# Patient Record
Sex: FEMALE
[De-identification: names, ages, dates, MRNs, and addresses within clinical notes are randomized per-mention and may not be internally consistent; named-entity substitution may affect disease eponyms.]

---

## 2020-05-18 ENCOUNTER — TELEPHONE (OUTPATIENT)
Dept: SCHEDULING | Age: 25
End: 2020-05-18

## 2020-12-22 ENCOUNTER — OFFICE VISIT (OUTPATIENT)
Dept: OBGYN CLINIC | Facility: CLINIC | Age: 25
End: 2020-12-22
Payer: COMMERCIAL

## 2020-12-22 ENCOUNTER — TELEPHONE (OUTPATIENT)
Dept: OBGYN CLINIC | Facility: CLINIC | Age: 25
End: 2020-12-22

## 2020-12-22 ENCOUNTER — LAB ENCOUNTER (OUTPATIENT)
Dept: LAB | Facility: HOSPITAL | Age: 25
End: 2020-12-22
Attending: OBSTETRICS & GYNECOLOGY

## 2020-12-22 VITALS
WEIGHT: 133 LBS | BODY MASS INDEX: 22.71 KG/M2 | SYSTOLIC BLOOD PRESSURE: 110 MMHG | DIASTOLIC BLOOD PRESSURE: 60 MMHG | HEIGHT: 64 IN

## 2020-12-22 DIAGNOSIS — N92.6 IRREGULAR UTERINE BLEEDING: ICD-10-CM

## 2020-12-22 DIAGNOSIS — N93.9 VAGINAL BLEEDING: ICD-10-CM

## 2020-12-22 DIAGNOSIS — N93.9 VAGINAL BLEEDING: Primary | ICD-10-CM

## 2020-12-22 DIAGNOSIS — Z30.09 ENCOUNTER FOR COUNSELING REGARDING CONTRACEPTION: ICD-10-CM

## 2020-12-22 PROCEDURE — 99204 OFFICE O/P NEW MOD 45 MIN: CPT | Performed by: OBSTETRICS & GYNECOLOGY

## 2020-12-22 PROCEDURE — 3078F DIAST BP <80 MM HG: CPT | Performed by: OBSTETRICS & GYNECOLOGY

## 2020-12-22 PROCEDURE — 3074F SYST BP LT 130 MM HG: CPT | Performed by: OBSTETRICS & GYNECOLOGY

## 2020-12-22 PROCEDURE — 81025 URINE PREGNANCY TEST: CPT | Performed by: OBSTETRICS & GYNECOLOGY

## 2020-12-22 PROCEDURE — 36415 COLL VENOUS BLD VENIPUNCTURE: CPT

## 2020-12-22 PROCEDURE — 84702 CHORIONIC GONADOTROPIN TEST: CPT

## 2020-12-22 PROCEDURE — 3008F BODY MASS INDEX DOCD: CPT | Performed by: OBSTETRICS & GYNECOLOGY

## 2020-12-22 NOTE — PROGRESS NOTES
HPI:   Ebony Holguin is a 31year old female who presents for a hx of irregular vaginal bleeding starting approx mid nov 2020. Pt stated she normally uses condoms for contraception but did not use it once in mid November as well.  Pt counseled extensively Location: Right arm, Patient Position: Sitting, Cuff Size: adult)   Ht 5' 4\" (1.626 m)   Wt 133 lb (60.3 kg)   LMP 11/23/2020 (Approximate)   Breastfeeding No   BMI 22.83 kg/m²   Body mass index is 22.83 kg/m².    GENERAL: well developed, well nourished,in

## 2020-12-26 ENCOUNTER — LAB ENCOUNTER (OUTPATIENT)
Dept: LAB | Facility: HOSPITAL | Age: 25
End: 2020-12-26
Attending: OBSTETRICS & GYNECOLOGY

## 2020-12-26 DIAGNOSIS — N93.9 VAGINAL BLEEDING: ICD-10-CM

## 2020-12-26 PROCEDURE — 84702 CHORIONIC GONADOTROPIN TEST: CPT

## 2020-12-26 PROCEDURE — 36415 COLL VENOUS BLD VENIPUNCTURE: CPT

## 2020-12-30 ENCOUNTER — TELEPHONE (OUTPATIENT)
Dept: OBGYN CLINIC | Facility: CLINIC | Age: 25
End: 2020-12-30

## 2020-12-30 NOTE — TELEPHONE ENCOUNTER
----- Message from Anushka Pradhan MD sent at 12/30/2020  1:22 PM CST -----  bhcg dropped from 34 to 17 , c/w nonviable early pregnancy vs early miscarriage/chemical pregnancy. Called pt and left message to call us back.

## 2021-11-11 ENCOUNTER — TELEPHONE (OUTPATIENT)
Dept: OTHER | Age: 26
End: 2021-11-11

## 2021-11-11 ENCOUNTER — OFFICE VISIT (OUTPATIENT)
Dept: FAMILY MEDICINE CLINIC | Facility: CLINIC | Age: 26
End: 2021-11-11
Payer: COMMERCIAL

## 2021-11-11 VITALS
WEIGHT: 140 LBS | BODY MASS INDEX: 23.9 KG/M2 | HEIGHT: 64 IN | DIASTOLIC BLOOD PRESSURE: 79 MMHG | HEART RATE: 77 BPM | SYSTOLIC BLOOD PRESSURE: 127 MMHG

## 2021-11-11 DIAGNOSIS — N89.8 VAGINAL DISCHARGE: Primary | ICD-10-CM

## 2021-11-11 DIAGNOSIS — M79.605 LEFT LEG PAIN: ICD-10-CM

## 2021-11-11 DIAGNOSIS — L02.214 CUTANEOUS ABSCESS OF GROIN: ICD-10-CM

## 2021-11-11 PROCEDURE — 3008F BODY MASS INDEX DOCD: CPT | Performed by: PHYSICIAN ASSISTANT

## 2021-11-11 PROCEDURE — 3074F SYST BP LT 130 MM HG: CPT | Performed by: PHYSICIAN ASSISTANT

## 2021-11-11 PROCEDURE — 3078F DIAST BP <80 MM HG: CPT | Performed by: PHYSICIAN ASSISTANT

## 2021-11-11 PROCEDURE — 99203 OFFICE O/P NEW LOW 30 MIN: CPT | Performed by: PHYSICIAN ASSISTANT

## 2021-11-11 RX ORDER — SULFAMETHOXAZOLE AND TRIMETHOPRIM 800; 160 MG/1; MG/1
1 TABLET ORAL 2 TIMES DAILY
Qty: 14 TABLET | Refills: 0 | Status: SHIPPED | OUTPATIENT
Start: 2021-11-11 | End: 2021-11-18

## 2021-11-11 NOTE — PROGRESS NOTES
HPI:     HPI   31year-old female is here in the office complains of intermittent leg pain for the past week. No pain today. No numbness and tingling sensation. No leg swollen and no redness.   Patient also complains of vaginal discharge and odor for 3 days Concerns:        Not on file    Social History Narrative      Not on file    Social Determinants of Health  Financial Resource Strain: Not on file  Food Insecurity: Not on file  Transportation Needs: Not on file  Physical Activity: Not on file  Stress: Not eye: No discharge. Left eye: No discharge. Conjunctiva/sclera: Conjunctivae normal.   Cardiovascular:      Rate and Rhythm: Normal rate and regular rhythm. Heart sounds: Normal heart sounds, S1 normal and S2 normal. No murmur heard.

## 2021-11-11 NOTE — TELEPHONE ENCOUNTER
Reason for Call/Symptoms: pt states she is having pain in upper left leg  Onset: symptoms started a week ago  Courtesy Assessment: pt states pain is in the back of her leg and close to the groin area. Pain is an achy pain, today pain worsened.  Rates pain 7

## 2021-11-12 ENCOUNTER — TELEPHONE (OUTPATIENT)
Dept: FAMILY MEDICINE CLINIC | Facility: CLINIC | Age: 26
End: 2021-11-12

## 2021-11-12 NOTE — TELEPHONE ENCOUNTER
The patient called back to confirm. She will continue to take the Bactrim and vaginal cream.  She has already started the Bactrim.     Discussed with Chaitanya who stated orders were from Mellisa Mehta [place]

## 2021-11-12 NOTE — TELEPHONE ENCOUNTER
----- Message from Parul Tellez PA-C sent at 11/12/2021 10:30 AM CST -----  Discontinue Flagyl. Start Clindamycin vaginal cream. Rx sent to pharmacy.

## 2022-01-12 ENCOUNTER — OFFICE VISIT (OUTPATIENT)
Dept: OBGYN CLINIC | Facility: CLINIC | Age: 27
End: 2022-01-12
Payer: COMMERCIAL

## 2022-01-12 VITALS — DIASTOLIC BLOOD PRESSURE: 62 MMHG | BODY MASS INDEX: 21 KG/M2 | WEIGHT: 124 LBS | SYSTOLIC BLOOD PRESSURE: 110 MMHG

## 2022-01-12 DIAGNOSIS — N92.6 MISSED MENSES: Primary | ICD-10-CM

## 2022-01-12 DIAGNOSIS — L73.9 FOLLICULITIS: ICD-10-CM

## 2022-01-12 DIAGNOSIS — N91.2 AMENORRHEA: ICD-10-CM

## 2022-01-12 LAB
CONTROL LINE PRESENT WITH A CLEAR BACKGROUND (YES/NO): YES YES/NO
KIT LOT #: NORMAL NUMERIC

## 2022-01-12 PROCEDURE — 3074F SYST BP LT 130 MM HG: CPT | Performed by: OBSTETRICS & GYNECOLOGY

## 2022-01-12 PROCEDURE — 99214 OFFICE O/P EST MOD 30 MIN: CPT | Performed by: OBSTETRICS & GYNECOLOGY

## 2022-01-12 PROCEDURE — 81025 URINE PREGNANCY TEST: CPT | Performed by: OBSTETRICS & GYNECOLOGY

## 2022-01-12 PROCEDURE — 3078F DIAST BP <80 MM HG: CPT | Performed by: OBSTETRICS & GYNECOLOGY

## 2022-01-12 RX ORDER — AMOXICILLIN 250 MG/1
250 CAPSULE ORAL 3 TIMES DAILY
Qty: 30 CAPSULE | Refills: 0 | Status: SHIPPED | OUTPATIENT
Start: 2022-01-12 | End: 2022-01-22

## 2022-01-12 NOTE — PROGRESS NOTES
@NAME is a 31year old female who presents for a    1) amenorrhea/missed menses/pcv:  Pt was counseled extensively on pregnancy care including diet in pregnancy/medications in pregnancy/weight gain in pregnancy/travel precautions in pregnancy/zika and covi hx of anemia  ENDOCRINE: denies thyroid history  ALL/ASTHMA: denies hx of allergy or asthma    EXAM:   /62   Wt 124 lb (56.2 kg)   LMP 11/23/2021   Breastfeeding No   BMI 21.28 kg/m²   Body mass index is 21.28 kg/m².    GENERAL: well developed, well n vaccination. All questions answered.

## 2022-01-19 ENCOUNTER — OFFICE VISIT (OUTPATIENT)
Dept: OBGYN CLINIC | Facility: CLINIC | Age: 27
End: 2022-01-19
Payer: COMMERCIAL

## 2022-01-19 VITALS
WEIGHT: 124 LBS | DIASTOLIC BLOOD PRESSURE: 74 MMHG | BODY MASS INDEX: 21.17 KG/M2 | SYSTOLIC BLOOD PRESSURE: 108 MMHG | HEIGHT: 64 IN

## 2022-01-19 DIAGNOSIS — N91.2 AMENORRHEA: Primary | ICD-10-CM

## 2022-01-19 DIAGNOSIS — N92.6 MISSED MENSES: ICD-10-CM

## 2022-01-19 PROCEDURE — 3078F DIAST BP <80 MM HG: CPT | Performed by: OBSTETRICS & GYNECOLOGY

## 2022-01-19 PROCEDURE — 99214 OFFICE O/P EST MOD 30 MIN: CPT | Performed by: OBSTETRICS & GYNECOLOGY

## 2022-01-19 PROCEDURE — 3008F BODY MASS INDEX DOCD: CPT | Performed by: OBSTETRICS & GYNECOLOGY

## 2022-01-19 PROCEDURE — 3074F SYST BP LT 130 MM HG: CPT | Performed by: OBSTETRICS & GYNECOLOGY

## 2022-01-19 NOTE — PROGRESS NOTES
@NAME is a 31year old female who presents for a    1) amenorrhea/missed menses/pcv:  Pt was counseled extensively on pregnancy care including diet in pregnancy/medications in pregnancy/weight gain in pregnancy/travel precautions in pregnancy/zika and covi is 21.28 kg/m².    GENERAL: well developed, well nourished,in no apparent distress  SKIN: no rashes,no suspicious lesions  HEENT: atraumatic, normocephalic  EYES:normal in appearance  NECK: supple,no adenopathy  CHEST: no chest tenderness  LUNGS: clear to a

## 2022-01-20 ENCOUNTER — TELEPHONE (OUTPATIENT)
Dept: OBGYN CLINIC | Facility: CLINIC | Age: 27
End: 2022-01-20

## 2022-01-20 NOTE — TELEPHONE ENCOUNTER
Patient name and  verified. Patient scheduled incorrectly for RN education visit today. Per ASJ note patient to have repeat ob ultrasound with him in 2 weeks and then to schedule RN education. appt cancelled.

## 2022-02-03 ENCOUNTER — TELEPHONE (OUTPATIENT)
Dept: OBGYN CLINIC | Facility: CLINIC | Age: 27
End: 2022-02-03

## 2022-02-03 NOTE — TELEPHONE ENCOUNTER
Pt had ingrown hair in vagina area. ASJ prescribed Amoxicillin.  Pt has taken all of RX and still in pain

## 2022-02-03 NOTE — TELEPHONE ENCOUNTER
Patient name and  verified. Patient previously given amox for folliculitis on 9/10/7652. States she is still in pain and still has lump to L groin area. Per patient believes lump is internal and now has redness. Has tried warm compresses without relief. Appt made for tomorrow with [facility]. Patient aware of time and location.

## 2022-02-04 ENCOUNTER — OFFICE VISIT (OUTPATIENT)
Dept: OBGYN CLINIC | Facility: CLINIC | Age: 27
End: 2022-02-04
Payer: COMMERCIAL

## 2022-02-04 VITALS — DIASTOLIC BLOOD PRESSURE: 62 MMHG | SYSTOLIC BLOOD PRESSURE: 110 MMHG | BODY MASS INDEX: 24 KG/M2 | WEIGHT: 137 LBS

## 2022-02-04 DIAGNOSIS — R19.09 LEFT GROIN MASS: Primary | ICD-10-CM

## 2022-02-04 PROCEDURE — 3074F SYST BP LT 130 MM HG: CPT | Performed by: NURSE PRACTITIONER

## 2022-02-04 PROCEDURE — 99213 OFFICE O/P EST LOW 20 MIN: CPT | Performed by: NURSE PRACTITIONER

## 2022-02-04 PROCEDURE — 3078F DIAST BP <80 MM HG: CPT | Performed by: NURSE PRACTITIONER

## 2022-02-07 ENCOUNTER — OFFICE VISIT (OUTPATIENT)
Dept: SURGERY | Facility: CLINIC | Age: 27
End: 2022-02-07
Payer: COMMERCIAL

## 2022-02-07 VITALS — HEIGHT: 64 IN | WEIGHT: 137 LBS | BODY MASS INDEX: 23.39 KG/M2

## 2022-02-07 DIAGNOSIS — R19.09 LEFT GROIN MASS: Primary | ICD-10-CM

## 2022-02-07 PROCEDURE — 99243 OFF/OP CNSLTJ NEW/EST LOW 30: CPT | Performed by: SURGERY

## 2022-02-07 PROCEDURE — 3008F BODY MASS INDEX DOCD: CPT | Performed by: SURGERY

## 2022-02-09 ENCOUNTER — OFFICE VISIT (OUTPATIENT)
Dept: OBGYN CLINIC | Facility: CLINIC | Age: 27
End: 2022-02-09
Payer: COMMERCIAL

## 2022-02-09 VITALS — SYSTOLIC BLOOD PRESSURE: 110 MMHG | DIASTOLIC BLOOD PRESSURE: 70 MMHG

## 2022-02-09 DIAGNOSIS — N92.6 MISSED MENSES: Primary | ICD-10-CM

## 2022-02-09 PROCEDURE — 3074F SYST BP LT 130 MM HG: CPT | Performed by: OBSTETRICS & GYNECOLOGY

## 2022-02-09 PROCEDURE — 99214 OFFICE O/P EST MOD 30 MIN: CPT | Performed by: OBSTETRICS & GYNECOLOGY

## 2022-02-09 PROCEDURE — 3078F DIAST BP <80 MM HG: CPT | Performed by: OBSTETRICS & GYNECOLOGY

## 2022-02-09 NOTE — PROGRESS NOTES
@NAME is a 31year old female who presents for a    1) amenorrhea/missed menses/pcv:  Pt was counseled extensively on pregnancy care including diet in pregnancy/medications in pregnancy/weight gain in pregnancy/travel precautions in pregnancy/zika and covid 19 precautions in pregnancy/ genetic testing in pregnancy/ dietary restrictions in pregnancy/among others and the pt voiced her understanding and all questions answered. Positive ucg noted today, pt counseled. Wt Readings from Last 6 Encounters:  02/07/22 : 137 lb (62.1 kg)  02/04/22 : 137 lb (62.1 kg)  01/19/22 : 124 lb (56.2 kg)  01/12/22 : 124 lb (56.2 kg)  11/11/21 : 140 lb (63.5 kg)  12/22/20 : 133 lb (60.3 kg)      There is no height or weight on file to calculate BMI. No results found for: CHOLEST, HDL, LDL, TRIGLY, AST, ALT     No current outpatient medications on file. No past medical history on file. No past surgical history on file. No family history on file. Social History:   Social History    Tobacco Use      Smoking status: Never Smoker      Smokeless tobacco: Never Used    Vaping Use      Vaping Use: Never used    Alcohol use: Yes    Drug use: Never           REVIEW OF SYSTEMS:   GENERAL: feels well otherwise  SKIN: denies any unusual skin lesions  EYES:denies blurred vision or double vision  HEENT: denies nasal congestion, sinus pain or ST  LUNGS: denies shortness of breath with exertion  CARDIOVASCULAR: denies chest pain on exertion  GI: denies abdominal pain,denies heartburn  : denies dysuria, vaginal discharge or itching,periods regular   MUSCULOSKELETAL: denies back pain  NEURO: denies headaches  PSYCHE: denies depression or anxiety  HEMATOLOGIC: denies hx of anemia  ENDOCRINE: denies thyroid history  ALL/ASTHMA: denies hx of allergy or asthma    EXAM:   /70   LMP 11/23/2021   There is no height or weight on file to calculate BMI.    GENERAL: well developed, well nourished,in no apparent distress  SKIN: no rashes,no suspicious lesions  HEENT: atraumatic, normocephalic  EYES:normal in appearance  NECK: supple,no adenopathy  CHEST: no chest tenderness  LUNGS: clear to auscultation  CARDIO: RRR without murmur  GI: good BS's,no masses, HSM or tenderness  Transvaginal ultrasound:  [facility],  crl c/w 8 1/7 weeks and [facility] 9/20/22. Small 0.92 cm subchor hemorrhage noted. Pt stated had scant spotting once before, none currently. Abo rh ordered. Normal adnexae, normal cx , normal uterus. fhts 150. MUSCULOSKELETAL: back is not tender,FROM of the back  EXTREMITIES: no cyanosis, clubbing or edema  NEURO: Oriented times three      ASSESSMENT AND PLAN:   Pilo Jones is a 31year old female who presents for a     1) amenorrhea/missed menses/pcv:  Pt was counseled extensively on pregnancy care including diet in pregnancy/medications in pregnancy/weight gain in pregnancy/travel precautions in pregnancy/zika and covid 19 precautions in pregnancy/ genetic testing in pregnancy/ dietary restrictions in pregnancy/among others and the pt voiced her understanding and all questions answered. Positive ucg noted today, pt counseled. Transvaginal ultrasound:  [facility],  crl c/w 8 1/7 weeks and [facility] 9/20/22. Small 0.92 cm subchor hemorrhage noted. Pt stated had scant spotting once before, none currently. Abo rh ordered. Normal adnexae, normal cx , normal uterus. fhts 150. Epic chart reviewed. All questions answered.

## 2022-02-16 NOTE — H&P
Chief complaint: Patient presents with:  Mass: Referred by ROSENDO Thomson for painful L groin mass which has been present for about 2 months. HPI: Ash Pearce resents for consultation related to a left groin mass which has been present for about 2 months. Ash Pearce first noticed a bump on her left groin in November, treated with an antibiotic. The bump failed to resolve and Ash Pearce felt that the bump had gotten bigger. She returned to have the area re-assessed and was prescribed amoxicillin. The bump is tender to touch and causes irritation in certain clothes or doing activities. The patient states that it is approx the size of a nickel. She has been using hot compresses regularly without resolution. The area is not open, it does not drain fluid. She denies fever or chills. The tenderness does not radiate. 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 Past medical history: History reviewed. No pertinent past medical history. Past surgical history: History reviewed. No pertinent surgical history. Allergies: No Known Allergies    Medications:   No current outpatient medications on file. Social history:   Social History    Socioeconomic History      Marital status: Single    Tobacco Use      Smoking status: Never Smoker      Smokeless tobacco: Never Used    Vaping Use      Vaping Use: Never used    Substance and Sexual Activity      Alcohol use: Yes      Drug use: Never       Family history:  History reviewed. No pertinent family history.      Review of Systems:   GENERAL: feels generally well  SKIN: no ulcerated or worrisome skin lesions  EYES:denies blurred vision or double vision  HEENT: denies new nasal congestion, sinus pain or ST  LUNGS: denies shortness of breath with exertion  CARDIOVASCULAR: denies chest pain on exertion  GI: no hematemesis, no BRBPR, no worsening heartburn  : no dysuria, no blood in urine, no difficulty urinating  MUSCULOSKELETAL: no new musculoskeletal complaints  NEURO: no persistent, recurrent  headaches  PSYCHE:no depression or anxiety  HEMATOLOGIC: no hx of blood dyscrasia  ENDOCRINE: no new endocrine problems  ALL/ASTHMA: no new hx of severe allergy or asthma  BACK: normal, no spinal deformity, no CVA tenderness    Physical examination:  Constitutional: appears well hydrated alert and responsive no acute distress noted  Head/Face: normocephalic, atraumatic. Eyes: Anicteric, PERRL  Nose/Mouth/Throat: nose and throat are clear,  no discharge, mucous membranes are moist, no oral lesions are noted  Neck/Thyroid: neck is supple without adenopathy, no thyromegaly, no JVD, no carotid bruits. Respiratory: normal to inspection, lungs are clear to auscultation bilaterally, normal respiratory effort, no wheezing. Cardiovascular: regular rate. Abdomen: soft, non-tender, non-distended, no organomegaly noted, no masses, no hernias, no ascites. Groin: Mass appearing inflammatory, cystic on palpation. Slightly tender, not fluctuant. Extremities: no edema, cyanosis, or clubbing. No deformity. Musculoskeletal: normal appearance, no deformities, normal function. Back wnl, no CVA tenderness. Neurological: exam appropriate for age reflexes and motor skills appropriate for age      Assessment and plan:  Diagnoses and all orders for this visit:    Left groin mass       We will plan surgical excision. The risks and benefits of surgery of been discussed, the differential diagnosis has been discussed.   Thank you for the referral.    Chantale Adame MD  2/16/2022  2:43 PM

## 2022-02-21 ENCOUNTER — TELEPHONE (OUTPATIENT)
Dept: OBGYN CLINIC | Facility: CLINIC | Age: 27
End: 2022-02-21

## 2022-02-21 ENCOUNTER — LAB ENCOUNTER (OUTPATIENT)
Dept: LAB | Age: 27
End: 2022-02-21
Attending: OBSTETRICS & GYNECOLOGY
Payer: COMMERCIAL

## 2022-02-21 DIAGNOSIS — O20.0 THREATENED ABORTION, ANTEPARTUM: ICD-10-CM

## 2022-02-21 LAB
ANTIBODY SCREEN: NEGATIVE
B-HCG SERPL-ACNC: ABNORMAL MIU/ML

## 2022-02-21 PROCEDURE — 84702 CHORIONIC GONADOTROPIN TEST: CPT

## 2022-02-21 PROCEDURE — 86850 RBC ANTIBODY SCREEN: CPT

## 2022-02-21 PROCEDURE — 36415 COLL VENOUS BLD VENIPUNCTURE: CPT

## 2022-02-21 NOTE — TELEPHONE ENCOUNTER
Patient had a lot of bleeding that started Saturday night and continued a little into Sunday morning. It was more than spotting. Please advise.

## 2022-02-21 NOTE — TELEPHONE ENCOUNTER
Patient informed of MLM recommendations. Lab orders placed and encouraged patient to complete labs today and repeat quant on Wednesday. Reviewed subchorionic hemorrhage noted on ultrasound with patient. Bleeding precautions reviewed with patient. Advised patient pelvic rest and aware for severer bleeding or pain patient to go to ED. Offered to schedule patient with [address] patient declined and states she will monitor. Encouraged patient to contact office with any questions.

## 2022-02-21 NOTE — TELEPHONE ENCOUNTER
I recommend that patient go to Lab for serial Quant BHCG's, one today and one Wednesday. Patient also needs to do Blood Type and Antibody screen today. Review Miscarriage Precautions with patient. Call patient.

## 2022-02-21 NOTE — TELEPHONE ENCOUNTER
OB History     T0    L0    SAB0  IAB0  Ectopic0  Multiple0  Live Births0   9w6d by ultrasound      Patient name and  verified. Patient complaining of vaginal bleeding since Saturday. States its only upon wiping and denies pain. Recent intercourse was 3 days ago. Describes color of bleeding as pink. Believes she passed small clots on Saturday, denies passing clots now. Recent in office ultrasound with ASJ on 22 shows small subchorionic hemorrhage Providers in office booked and ASJ not in office today. Routing to on call provider.  Please advise

## 2022-02-23 ENCOUNTER — LAB ENCOUNTER (OUTPATIENT)
Dept: LAB | Age: 27
End: 2022-02-23
Attending: OBSTETRICS & GYNECOLOGY
Payer: COMMERCIAL

## 2022-02-23 DIAGNOSIS — O20.0 THREATENED ABORTION, ANTEPARTUM: ICD-10-CM

## 2022-02-23 DIAGNOSIS — N92.6 MISSED MENSES: ICD-10-CM

## 2022-02-23 LAB
B-HCG SERPL-ACNC: ABNORMAL MIU/ML
RH BLOOD TYPE: POSITIVE

## 2022-02-23 PROCEDURE — 86901 BLOOD TYPING SEROLOGIC RH(D): CPT

## 2022-02-23 PROCEDURE — 84702 CHORIONIC GONADOTROPIN TEST: CPT

## 2022-02-23 PROCEDURE — 86900 BLOOD TYPING SEROLOGIC ABO: CPT

## 2022-02-23 PROCEDURE — 36415 COLL VENOUS BLD VENIPUNCTURE: CPT

## 2022-02-24 ENCOUNTER — HOSPITAL ENCOUNTER (OUTPATIENT)
Dept: ULTRASOUND IMAGING | Facility: HOSPITAL | Age: 27
Discharge: HOME | End: 2022-02-24
Attending: OBSTETRICS & GYNECOLOGY
Payer: COMMERCIAL

## 2022-02-24 ENCOUNTER — TELEPHONE (OUTPATIENT)
Dept: OBGYN CLINIC | Facility: CLINIC | Age: 27
End: 2022-02-24

## 2022-02-24 DIAGNOSIS — O20.0 THREATENED ABORTION, ANTEPARTUM: ICD-10-CM

## 2022-02-24 PROCEDURE — 76801 OB US < 14 WKS SINGLE FETUS: CPT | Performed by: OBSTETRICS & GYNECOLOGY

## 2022-02-24 NOTE — TELEPHONE ENCOUNTER
Pt Name and  verified. Pt informed that STAT hold and call placed by Thomas Savage and she needs to go to [facility] today at 4pm. Pt informed to drink at least 32 oz of water prior to exam. Instructions on location given, main entrance and [facility]. Voiced understanding. Order placed for STAT hold and call and on call provider MLM notified.

## 2022-02-24 NOTE — TELEPHONE ENCOUNTER
Pt stated had intercourse last sat once, and pt was sent for serial bhcg. Discussed bhcg with pt, and I advised a stat hold and call ultrasound to reassess pregnancy. Spoke with Uriah Paige and this is being arranged now for today.

## 2022-02-24 NOTE — TELEPHONE ENCOUNTER
Pt Name and  verified. Pt informed that provider is in a surgery and has not had an opportunity to look at her results. Advised that once he is in office, we will have him view results so he we can inform. Voiced understanding.

## 2022-02-25 ENCOUNTER — TELEPHONE (OUTPATIENT)
Dept: OBGYN CLINIC | Facility: CLINIC | Age: 27
End: 2022-02-25

## 2022-02-25 NOTE — TELEPHONE ENCOUNTER
Pt was already appraised of ultrasound findings of missed ab yesterday by dr Kwaku Li per pt. Pt counseled on options of management including expectant management vs medical management with meds vs surgical management with suction d & c,   Risks/benefits/alternatives counseled, and pt wants expectant management for now, and will call me in 3 days with an update, pt is considering a d & c as well and will call me in 3 days about her decision. Pt aware to go to the ER with any signiifcant pain/bleeding/any other problem.

## 2022-02-26 ENCOUNTER — TELEPHONE (OUTPATIENT)
Dept: OBGYN CLINIC | Facility: CLINIC | Age: 27
End: 2022-02-26

## 2022-02-26 DIAGNOSIS — O02.1 MISSED ABORTION: Primary | ICD-10-CM

## 2022-02-26 NOTE — TELEPHONE ENCOUNTER
Please schedule the following surgery:    Procedure:suction dilation and curretage  Assist: (Y/N or none)none  Date: coordinate with pt  Dx:missed ab  Pre-op appt: (Y/N or n/a)no  Admission type: (IN/OUT/OBVS)  Department of discharge(SDS/Floor):  Expected length of stay:outpt  Procedure length time (please enter amount you are requesting):30 min  Recovery time (patients always ask):  Medical Clearance: (Y/N)no      ALL Medicaid/including BCBS community: Tubal/ Hyst form MUST be signed (30 days):     COVID19 Lab 5062 needs to be placed for all C-sections, IOL & Versions     Message to nurses:

## 2022-02-26 NOTE — TELEPHONE ENCOUNTER
Pt Name and  verified. Pt spoke with KOJO last night due to a confirmed miscarriage. Pt was advised to call if she wanted to have a D&C. Pt wants to know what is the soonest available and when this can be done. Pt has also missed work since she had a confirmed miscarriage. Needs a note for work as well as an estimated time frame of when she can return after having a D&C and does not feel comfortable going to work just yet.  pls advise

## 2022-02-26 NOTE — TELEPHONE ENCOUNTER
Patient is unsure of what type of appointment or procedure needs to be scheduled after her recent miscarriage. Please advise.

## 2022-02-28 ENCOUNTER — LAB ENCOUNTER (OUTPATIENT)
Dept: LAB | Facility: HOSPITAL | Age: 27
End: 2022-02-28
Attending: OBSTETRICS & GYNECOLOGY
Payer: COMMERCIAL

## 2022-02-28 DIAGNOSIS — Z01.818 PRE-OP TESTING: ICD-10-CM

## 2022-02-28 LAB — SARS-COV-2 RNA RESP QL NAA+PROBE: NOT DETECTED

## 2022-02-28 NOTE — TELEPHONE ENCOUNTER
Per Artem in [place], there is a 1030 available for tomorrow 3/1. Called and spoke to pt. Pt informed of surgery date and time. Minor case letter sent to pt's mychart.

## 2022-03-01 ENCOUNTER — HOSPITAL ENCOUNTER (OUTPATIENT)
Facility: HOSPITAL | Age: 27
Setting detail: HOSPITAL OUTPATIENT SURGERY
Discharge: HOME | End: 2022-03-01
Attending: OBSTETRICS & GYNECOLOGY | Admitting: OBSTETRICS & GYNECOLOGY
Payer: COMMERCIAL

## 2022-03-01 ENCOUNTER — ANESTHESIA EVENT (OUTPATIENT)
Dept: SURGERY | Facility: HOSPITAL | Age: 27
End: 2022-03-01
Payer: COMMERCIAL

## 2022-03-01 ENCOUNTER — TELEPHONE (OUTPATIENT)
Dept: OBGYN CLINIC | Facility: CLINIC | Age: 27
End: 2022-03-01

## 2022-03-01 ENCOUNTER — ANESTHESIA (OUTPATIENT)
Dept: SURGERY | Facility: HOSPITAL | Age: 27
End: 2022-03-01
Payer: COMMERCIAL

## 2022-03-01 VITALS
WEIGHT: 135.31 LBS | HEIGHT: 64 IN | SYSTOLIC BLOOD PRESSURE: 103 MMHG | TEMPERATURE: 97 F | RESPIRATION RATE: 16 BRPM | BODY MASS INDEX: 23.1 KG/M2 | DIASTOLIC BLOOD PRESSURE: 58 MMHG | HEART RATE: 53 BPM | OXYGEN SATURATION: 98 %

## 2022-03-01 DIAGNOSIS — Z01.818 PRE-OP TESTING: Primary | ICD-10-CM

## 2022-03-01 DIAGNOSIS — O02.1 MISSED ABORTION: ICD-10-CM

## 2022-03-01 PROCEDURE — 10D17ZZ EXTRACTION OF PRODUCTS OF CONCEPTION, RETAINED, VIA NATURAL OR ARTIFICIAL OPENING: ICD-10-PCS | Performed by: OBSTETRICS & GYNECOLOGY

## 2022-03-01 PROCEDURE — 59820 CARE OF MISCARRIAGE: CPT | Performed by: OBSTETRICS & GYNECOLOGY

## 2022-03-01 RX ORDER — HYDROCODONE BITARTRATE AND ACETAMINOPHEN 5; 325 MG/1; MG/1
2 TABLET ORAL AS NEEDED
Status: DISCONTINUED | OUTPATIENT
Start: 2022-03-01 | End: 2022-03-01

## 2022-03-01 RX ORDER — ONDANSETRON 2 MG/ML
4 INJECTION INTRAMUSCULAR; INTRAVENOUS ONCE AS NEEDED
Status: DISCONTINUED | OUTPATIENT
Start: 2022-03-01 | End: 2022-03-01

## 2022-03-01 RX ORDER — PROCHLORPERAZINE EDISYLATE 5 MG/ML
5 INJECTION INTRAMUSCULAR; INTRAVENOUS ONCE AS NEEDED
Status: DISCONTINUED | OUTPATIENT
Start: 2022-03-01 | End: 2022-03-01

## 2022-03-01 RX ORDER — NALOXONE HYDROCHLORIDE 0.4 MG/ML
80 INJECTION, SOLUTION INTRAMUSCULAR; INTRAVENOUS; SUBCUTANEOUS AS NEEDED
Status: DISCONTINUED | OUTPATIENT
Start: 2022-03-01 | End: 2022-03-01

## 2022-03-01 RX ORDER — HYDROMORPHONE HYDROCHLORIDE 1 MG/ML
0.6 INJECTION, SOLUTION INTRAMUSCULAR; INTRAVENOUS; SUBCUTANEOUS EVERY 5 MIN PRN
Status: DISCONTINUED | OUTPATIENT
Start: 2022-03-01 | End: 2022-03-01

## 2022-03-01 RX ORDER — HALOPERIDOL 5 MG/ML
0.25 INJECTION INTRAMUSCULAR ONCE AS NEEDED
Status: DISCONTINUED | OUTPATIENT
Start: 2022-03-01 | End: 2022-03-01

## 2022-03-01 RX ORDER — CEFAZOLIN SODIUM/WATER 2 G/20 ML
SYRINGE (ML) INTRAVENOUS AS NEEDED
Status: DISCONTINUED | OUTPATIENT
Start: 2022-03-01 | End: 2022-03-01 | Stop reason: SURG

## 2022-03-01 RX ORDER — HYDROMORPHONE HYDROCHLORIDE 1 MG/ML
0.4 INJECTION, SOLUTION INTRAMUSCULAR; INTRAVENOUS; SUBCUTANEOUS EVERY 5 MIN PRN
Status: DISCONTINUED | OUTPATIENT
Start: 2022-03-01 | End: 2022-03-01

## 2022-03-01 RX ORDER — ONDANSETRON 2 MG/ML
INJECTION INTRAMUSCULAR; INTRAVENOUS AS NEEDED
Status: DISCONTINUED | OUTPATIENT
Start: 2022-03-01 | End: 2022-03-01 | Stop reason: SURG

## 2022-03-01 RX ORDER — MIDAZOLAM HYDROCHLORIDE 1 MG/ML
INJECTION INTRAMUSCULAR; INTRAVENOUS AS NEEDED
Status: DISCONTINUED | OUTPATIENT
Start: 2022-03-01 | End: 2022-03-01 | Stop reason: SURG

## 2022-03-01 RX ORDER — SODIUM CHLORIDE, SODIUM LACTATE, POTASSIUM CHLORIDE, CALCIUM CHLORIDE 600; 310; 30; 20 MG/100ML; MG/100ML; MG/100ML; MG/100ML
INJECTION, SOLUTION INTRAVENOUS CONTINUOUS
Status: DISCONTINUED | OUTPATIENT
Start: 2022-03-01 | End: 2022-03-01

## 2022-03-01 RX ORDER — MORPHINE SULFATE 4 MG/ML
2 INJECTION, SOLUTION INTRAMUSCULAR; INTRAVENOUS EVERY 10 MIN PRN
Status: DISCONTINUED | OUTPATIENT
Start: 2022-03-01 | End: 2022-03-01

## 2022-03-01 RX ORDER — LIDOCAINE HYDROCHLORIDE 10 MG/ML
INJECTION, SOLUTION EPIDURAL; INFILTRATION; INTRACAUDAL; PERINEURAL AS NEEDED
Status: DISCONTINUED | OUTPATIENT
Start: 2022-03-01 | End: 2022-03-01 | Stop reason: SURG

## 2022-03-01 RX ORDER — MORPHINE SULFATE 10 MG/ML
6 INJECTION, SOLUTION INTRAMUSCULAR; INTRAVENOUS EVERY 10 MIN PRN
Status: DISCONTINUED | OUTPATIENT
Start: 2022-03-01 | End: 2022-03-01

## 2022-03-01 RX ORDER — DOXYCYCLINE HYCLATE 100 MG
100 TABLET ORAL 2 TIMES DAILY
Qty: 15 TABLET | Refills: 0 | Status: SHIPPED | OUTPATIENT
Start: 2022-03-01 | End: 2022-03-08

## 2022-03-01 RX ORDER — DEXAMETHASONE SODIUM PHOSPHATE 4 MG/ML
VIAL (ML) INJECTION AS NEEDED
Status: DISCONTINUED | OUTPATIENT
Start: 2022-03-01 | End: 2022-03-01 | Stop reason: SURG

## 2022-03-01 RX ORDER — MORPHINE SULFATE 4 MG/ML
4 INJECTION, SOLUTION INTRAMUSCULAR; INTRAVENOUS EVERY 10 MIN PRN
Status: DISCONTINUED | OUTPATIENT
Start: 2022-03-01 | End: 2022-03-01

## 2022-03-01 RX ORDER — HYDROCODONE BITARTRATE AND ACETAMINOPHEN 5; 325 MG/1; MG/1
1 TABLET ORAL AS NEEDED
Status: DISCONTINUED | OUTPATIENT
Start: 2022-03-01 | End: 2022-03-01

## 2022-03-01 RX ORDER — OXYTOCIN 10 [USP'U]/ML
INJECTION, SOLUTION INTRAMUSCULAR; INTRAVENOUS AS NEEDED
Status: DISCONTINUED | OUTPATIENT
Start: 2022-03-01 | End: 2022-03-01 | Stop reason: SURG

## 2022-03-01 RX ORDER — HYDROMORPHONE HYDROCHLORIDE 1 MG/ML
0.2 INJECTION, SOLUTION INTRAMUSCULAR; INTRAVENOUS; SUBCUTANEOUS EVERY 5 MIN PRN
Status: DISCONTINUED | OUTPATIENT
Start: 2022-03-01 | End: 2022-03-01

## 2022-03-01 RX ORDER — ACETAMINOPHEN 500 MG
1000 TABLET ORAL ONCE
Status: COMPLETED | OUTPATIENT
Start: 2022-03-01 | End: 2022-03-01

## 2022-03-01 RX ADMIN — ONDANSETRON 4 MG: 2 INJECTION INTRAMUSCULAR; INTRAVENOUS at 10:53:00

## 2022-03-01 RX ADMIN — MIDAZOLAM HYDROCHLORIDE 2 MG: 1 INJECTION INTRAMUSCULAR; INTRAVENOUS at 10:43:00

## 2022-03-01 RX ADMIN — CEFAZOLIN SODIUM/WATER 2 G: 2 G/20 ML SYRINGE (ML) INTRAVENOUS at 10:53:00

## 2022-03-01 RX ADMIN — OXYTOCIN 20 UNITS: 10 INJECTION, SOLUTION INTRAMUSCULAR; INTRAVENOUS at 11:00:00

## 2022-03-01 RX ADMIN — LIDOCAINE HYDROCHLORIDE 50 MG: 10 INJECTION, SOLUTION EPIDURAL; INFILTRATION; INTRACAUDAL; PERINEURAL at 10:49:00

## 2022-03-01 RX ADMIN — SODIUM CHLORIDE, SODIUM LACTATE, POTASSIUM CHLORIDE, CALCIUM CHLORIDE: 600; 310; 30; 20 INJECTION, SOLUTION INTRAVENOUS at 11:12:00

## 2022-03-01 RX ADMIN — DEXAMETHASONE SODIUM PHOSPHATE 4 MG: 4 MG/ML VIAL (ML) INJECTION at 10:53:00

## 2022-03-01 NOTE — BRIEF OP NOTE
Pre-Operative Diagnosis: Missed  [O02.1]     Post-Operative Diagnosis: Missed  [O02.1]      Procedure Performed:   SUCTION DILATION AND CURETTAGE    Surgeon(s) and Role:     * Tamiko Reddy MD - Primary    Assistant(s):      none  Surgical Findings: mod amount of poc evacuated and sent for chromosomes per pt request     Specimen: poc     Estimated Blood Loss: No data recorded    Dictation Number:  Pending. Allan Rogel MD  3/1/2022  11:17 AM

## 2022-03-01 NOTE — ANESTHESIA PROCEDURE NOTES
Airway  Date/Time: 3/1/2022 10:50 AM  Urgency: Elective      General Information and Staff    Patient location during procedure: OR  Anesthesiologist: Tsering Garces MD  Resident/CRNA: Amelia Araiza CRNA  Performed: CRNA     Indications and Patient Condition  Indications for airway management: anesthesia  Sedation level: deep  Preoxygenated: yes  Patient position: sniffing  Mask difficulty assessment: 1 - vent by mask    Final Airway Details  Final airway type: supraglottic airway      Successful airway: classic  Size 4      Number of attempts at approach: 1  Number of other approaches attempted: 0    Additional Comments  Atraumatic. Lips, tongue and all teeth in preop condition.

## 2022-03-02 NOTE — OPERATIVE REPORT
[facility]    PATIENT'S NAME: Angle Morales   ATTENDING PHYSICIAN: Allan Foster MD   OPERATING PHYSICIAN: Elidia Foster MD   PATIENT ACCOUNT#:   [de-identified]    LOCATION:  [place] [address]  MEDICAL RECORD #:   [id number]       YOB: 1995  ADMISSION DATE:       2022      OPERATION DATE:  2022    OPERATIVE REPORT    PREOPERATIVE DIAGNOSIS:  Missed . POSTOPERATIVE DIAGNOSIS:  Missed . PROCEDURE:  Suction dilation and curettage. ANESTHESIA:  General.    ESTIMATED BLOOD LOSS:  50 mL. COMPLICATIONS:  None. CONDITION:  The patient tolerated the procedure well was taken to [facility] in good condition. INDICATIONS:  The patient is a 29-year-old female noted to have missed AB/fetal demise on the initial ultrasound. She was also noted to have a dropping beta HCG. The patient was counseled on her options and after much thought, the patient requested a suction dilation and curettage with the risks, benefits, alternatives counseled including risk of bleeding, infection, damage to internal organs (including bowel, bladder, uterus, ovaries, tubes, cervix, vagina, ureters, blood vessels, among other organs), risk of anesthesia, risk of thromboembolic disease such as DVT, PE, MI, stroke, risk of hemorrhage and blood transfusion, risk of Asherman syndrome, among other risks. The patient voiced understanding of all the above, and her questions were answered. The patient is O positive. Therefore, RhoGAM was not indicated. FINDINGS:  Moderate amount of products of conception evacuated. OPERATIVE TECHNIQUE:  The patient was taken to the operating room and after induction of general anesthesia, the patient was prepped and draped in the usual sterile fashion. Two g of Ancef were infused preoperatively for infection prophylaxis. Exam under anesthesia was performed after which a weighted speculum was placed in the vagina.   A single-tooth tenaculum was placed on the anterior lip of the cervix. The patient voided prior to procedure. Therefore, catheterization was not needed of the bladder. The cervix was noted to be already dilated 1 cm. The #7 curved suction curette was advanced to the fundus, and a moderate amount of products of conception was evacuated. At this time, the curette was changed to a #8 curved suction curette in order to ensure that all products of conception were evacuated and no further products of conception were noted on use of the #8 curved suction curette curettage. Gentle curettage was then performed, and good uterine cry was noted in all quadrants. At the end the procedure, excellent hemostasis was noted. Single-tooth tenaculum was removed from the anterior lip of the cervix and excellent hemostasis was assured. All counts were correct. Good hemostasis noted at the end of the procedure. Pitocin 20 units in 1000 mL was started at the beginning of the procedure. The patient tolerated the procedure well and was taken to [facility] in good condition. Dictated By Olga Dent MD  d: 03/01/2022 19:47:04  t: 03/02/2022 00:09:09  Job [id number]  [facility]/

## 2022-03-04 ENCOUNTER — TELEPHONE (OUTPATIENT)
Dept: OBGYN CLINIC | Facility: CLINIC | Age: 27
End: 2022-03-04

## 2022-03-04 NOTE — TELEPHONE ENCOUNTER
pts forms received and logged for processing. Sent my chart message for HIPPA.  HIpAA signed in office invalid

## 2022-03-04 NOTE — TELEPHONE ENCOUNTER
Pt dropped off FMLA forms to be filled out. Pt sign HIPAA form but did not pay fee. Pt fill out would like forms to be faxed.

## 2022-03-08 ENCOUNTER — TELEPHONE (OUTPATIENT)
Dept: OBGYN UNIT | Facility: HOSPITAL | Age: 27
End: 2022-03-08

## 2022-03-11 NOTE — TELEPHONE ENCOUNTER
Pt Name and  verified. Pt provided with the number for the Forms dept. Advised to give them a call today or Monday again. Voiced understanding.

## 2022-03-14 NOTE — TELEPHONE ENCOUNTER
Dr. Kareem Patino,     Please sign off on form:  -Highlight the patient and hit \"Chart\" button. -In Chart Review, w/in the Encounter tab - click 1 time on the Telephone call encounter for 3/4/22 Scroll down the telephone encounter.  -Click \"scan on\" blue Hyperlink under \"Media\" heading for FMLA Dr. Kareem Patino 3/14/22 w/in the telephone enc.  -Click on Acknowledge button at the bottom right corner and left-click onto image, signature stamp appears and drag signature to Provider signature line. Stamp will turn blue. Close window.      Thank you,    Javy Gregory

## 2022-03-18 ENCOUNTER — TELEPHONE (OUTPATIENT)
Dept: OBGYN CLINIC | Facility: CLINIC | Age: 27
End: 2022-03-18

## 2022-04-01 ENCOUNTER — TELEPHONE (OUTPATIENT)
Dept: SURGERY | Facility: CLINIC | Age: 27
End: 2022-04-01

## 2022-04-06 ENCOUNTER — OFFICE VISIT (OUTPATIENT)
Dept: SURGERY | Facility: CLINIC | Age: 27
End: 2022-04-06
Payer: COMMERCIAL

## 2022-04-06 VITALS — BODY MASS INDEX: 23.05 KG/M2 | HEIGHT: 64 IN | WEIGHT: 135 LBS

## 2022-04-06 DIAGNOSIS — R19.09 LEFT GROIN MASS: Primary | ICD-10-CM

## 2022-04-06 PROCEDURE — 99213 OFFICE O/P EST LOW 20 MIN: CPT | Performed by: SURGERY

## 2022-04-06 PROCEDURE — 3008F BODY MASS INDEX DOCD: CPT | Performed by: SURGERY

## 2022-04-13 ENCOUNTER — OFFICE VISIT (OUTPATIENT)
Dept: SURGERY | Facility: CLINIC | Age: 27
End: 2022-04-13
Payer: COMMERCIAL

## 2022-04-13 VITALS — BODY MASS INDEX: 23.05 KG/M2 | HEIGHT: 64 IN | WEIGHT: 135 LBS

## 2022-04-13 DIAGNOSIS — R19.09 LEFT GROIN MASS: Primary | ICD-10-CM

## 2022-04-13 DIAGNOSIS — N75.1 ABSCESS OF LEFT BARTHOLIN'S GLAND: ICD-10-CM

## 2022-04-13 DIAGNOSIS — L73.2 HIDRADENITIS SUPPURATIVA: ICD-10-CM

## 2022-04-13 PROCEDURE — 88305 TISSUE EXAM BY PATHOLOGIST: CPT | Performed by: SURGERY

## 2022-04-13 RX ORDER — AMOXICILLIN AND CLAVULANATE POTASSIUM 875; 125 MG/1; MG/1
1 TABLET, FILM COATED ORAL 2 TIMES DAILY
Qty: 14 TABLET | Refills: 1 | Status: SHIPPED | OUTPATIENT
Start: 2022-04-13

## 2022-04-13 NOTE — PROCEDURES
Procedure Note  The risks, benefits, alternatives and expected recovery were explained. The pt expressed understanding and wished to proceed with the procedure. Preop:  complicated Left groin hidradenitis  Postop:  Same  Procedure: Excision of complicated Left groin hidradenitis, Layered closure of 3.1 cm incision. Anesthesia:  Local, 1% lidocaine with epi, 12 cc  EBL:  Minimal  Description:  The skin was prepped and draped in sterile fashion. The skin and subcutaneous tissue surrounding the lesion was infiltrated with local anesthetic. A 3.1 cm elliptical incision was made of the skin and subcutaneous tissues down to the fascia to excise the complicated area of chronic recurring hidradenitis. The entire area was excised. The defect was irrigated with saline. The wound was closed in a layered fashion with 3.0 vicryl and 4.0 vicryl. Layered closure was necessary due to the size of the defect and the tension across the incision due to its location. Steris were applied and a gauze dressing was placed over this. The patient tolerated the procedure well and was given wound care instructions. The excised tissue was sent for routine pathology.    David Urena MD

## 2022-06-03 ENCOUNTER — HOSPITAL ENCOUNTER (EMERGENCY)
Facility: HOSPITAL | Age: 27
Discharge: HOME | End: 2022-06-03
Payer: COMMERCIAL

## 2022-06-03 ENCOUNTER — APPOINTMENT (OUTPATIENT)
Dept: ULTRASOUND IMAGING | Facility: HOSPITAL | Age: 27
End: 2022-06-03
Attending: NURSE PRACTITIONER
Payer: COMMERCIAL

## 2022-06-03 ENCOUNTER — TELEPHONE (OUTPATIENT)
Dept: OBGYN CLINIC | Facility: CLINIC | Age: 27
End: 2022-06-03

## 2022-06-03 VITALS
HEART RATE: 69 BPM | DIASTOLIC BLOOD PRESSURE: 79 MMHG | WEIGHT: 133 LBS | HEIGHT: 64 IN | TEMPERATURE: 98 F | RESPIRATION RATE: 18 BRPM | SYSTOLIC BLOOD PRESSURE: 119 MMHG | OXYGEN SATURATION: 98 % | BODY MASS INDEX: 22.71 KG/M2

## 2022-06-03 DIAGNOSIS — N30.00 ACUTE CYSTITIS WITHOUT HEMATURIA: Primary | ICD-10-CM

## 2022-06-03 DIAGNOSIS — O20.0 THREATENED ABORTION: ICD-10-CM

## 2022-06-03 LAB
ALBUMIN SERPL-MCNC: 4.1 G/DL (ref 3.4–5)
ALP LIVER SERPL-CCNC: 61 U/L
ALT SERPL-CCNC: 19 U/L
ANION GAP SERPL CALC-SCNC: 6 MMOL/L (ref 0–18)
AST SERPL-CCNC: 16 U/L (ref 15–37)
B-HCG SERPL-ACNC: ABNORMAL MIU/ML
B-HCG UR QL: POSITIVE
BASOPHILS # BLD AUTO: 0.03 X10(3) UL (ref 0–0.2)
BASOPHILS NFR BLD AUTO: 0.3 %
BILIRUB DIRECT SERPL-MCNC: <0.1 MG/DL (ref 0–0.2)
BILIRUB SERPL-MCNC: 0.3 MG/DL (ref 0.1–2)
BILIRUB UR QL: NEGATIVE
BUN BLD-MCNC: 8 MG/DL (ref 7–18)
BUN/CREAT SERPL: 11.6 (ref 10–20)
CALCIUM BLD-MCNC: 8.9 MG/DL (ref 8.5–10.1)
CHLORIDE SERPL-SCNC: 105 MMOL/L (ref 98–112)
CO2 SERPL-SCNC: 25 MMOL/L (ref 21–32)
COLOR UR: YELLOW
CREAT BLD-MCNC: 0.69 MG/DL
DEPRECATED RDW RBC AUTO: 41.9 FL (ref 35.1–46.3)
EOSINOPHIL # BLD AUTO: 0.27 X10(3) UL (ref 0–0.7)
EOSINOPHIL NFR BLD AUTO: 3.1 %
ERYTHROCYTE [DISTWIDTH] IN BLOOD BY AUTOMATED COUNT: 12.6 % (ref 11–15)
GLUCOSE BLD-MCNC: 98 MG/DL (ref 70–99)
GLUCOSE UR-MCNC: NEGATIVE MG/DL
HCT VFR BLD AUTO: 40.1 %
HGB BLD-MCNC: 13.4 G/DL
HGB UR QL STRIP.AUTO: NEGATIVE
IMM GRANULOCYTES # BLD AUTO: 0.03 X10(3) UL (ref 0–1)
IMM GRANULOCYTES NFR BLD: 0.3 %
KETONES UR-MCNC: 20 MG/DL
LYMPHOCYTES # BLD AUTO: 2.6 X10(3) UL (ref 1–4)
LYMPHOCYTES NFR BLD AUTO: 29.5 %
MCH RBC QN AUTO: 30.2 PG (ref 26–34)
MCHC RBC AUTO-ENTMCNC: 33.4 G/DL (ref 31–37)
MCV RBC AUTO: 90.5 FL
MONOCYTES # BLD AUTO: 0.62 X10(3) UL (ref 0.1–1)
MONOCYTES NFR BLD AUTO: 7 %
NEUTROPHILS # BLD AUTO: 5.26 X10 (3) UL (ref 1.5–7.7)
NEUTROPHILS # BLD AUTO: 5.26 X10(3) UL (ref 1.5–7.7)
NEUTROPHILS NFR BLD AUTO: 59.8 %
NITRITE UR QL STRIP.AUTO: NEGATIVE
OSMOLALITY SERPL CALC.SUM OF ELEC: 280 MOSM/KG (ref 275–295)
PH UR: 6 [PH] (ref 5–8)
PLATELET # BLD AUTO: 224 10(3)UL (ref 150–450)
POTASSIUM SERPL-SCNC: 3.3 MMOL/L (ref 3.5–5.1)
PROT SERPL-MCNC: 7.6 G/DL (ref 6.4–8.2)
PROT UR-MCNC: NEGATIVE MG/DL
RBC # BLD AUTO: 4.43 X10(6)UL
RH BLOOD TYPE: POSITIVE
SODIUM SERPL-SCNC: 136 MMOL/L (ref 136–145)
SP GR UR STRIP: 1.01 (ref 1–1.03)
UROBILINOGEN UR STRIP-ACNC: <2
VIT C UR-MCNC: 40 MG/DL
WBC # BLD AUTO: 8.8 X10(3) UL (ref 4–11)

## 2022-06-03 PROCEDURE — 80076 HEPATIC FUNCTION PANEL: CPT | Performed by: NURSE PRACTITIONER

## 2022-06-03 PROCEDURE — 76801 OB US < 14 WKS SINGLE FETUS: CPT | Performed by: NURSE PRACTITIONER

## 2022-06-03 PROCEDURE — 86900 BLOOD TYPING SEROLOGIC ABO: CPT | Performed by: NURSE PRACTITIONER

## 2022-06-03 PROCEDURE — 96360 HYDRATION IV INFUSION INIT: CPT

## 2022-06-03 PROCEDURE — 76817 TRANSVAGINAL US OBSTETRIC: CPT | Performed by: NURSE PRACTITIONER

## 2022-06-03 PROCEDURE — 87086 URINE CULTURE/COLONY COUNT: CPT | Performed by: NURSE PRACTITIONER

## 2022-06-03 PROCEDURE — 80048 BASIC METABOLIC PNL TOTAL CA: CPT | Performed by: NURSE PRACTITIONER

## 2022-06-03 PROCEDURE — 99284 EMERGENCY DEPT VISIT MOD MDM: CPT

## 2022-06-03 PROCEDURE — 85025 COMPLETE CBC W/AUTO DIFF WBC: CPT | Performed by: NURSE PRACTITIONER

## 2022-06-03 PROCEDURE — 81001 URINALYSIS AUTO W/SCOPE: CPT | Performed by: NURSE PRACTITIONER

## 2022-06-03 PROCEDURE — 86901 BLOOD TYPING SEROLOGIC RH(D): CPT | Performed by: NURSE PRACTITIONER

## 2022-06-03 PROCEDURE — 84702 CHORIONIC GONADOTROPIN TEST: CPT | Performed by: NURSE PRACTITIONER

## 2022-06-03 PROCEDURE — 81025 URINE PREGNANCY TEST: CPT

## 2022-06-03 RX ORDER — CEPHALEXIN 500 MG/1
500 CAPSULE ORAL 2 TIMES DAILY
Qty: 14 CAPSULE | Refills: 0 | Status: SHIPPED | OUTPATIENT
Start: 2022-06-03 | End: 2022-06-10

## 2022-06-03 NOTE — ED INITIAL ASSESSMENT (HPI)
Pt presents to the ER with lower back pain, spotting, and cramping x 4 days.     Pt reports positive at home pregnancy test. LMP 4/07

## 2022-06-03 NOTE — TELEPHONE ENCOUNTER
Pt's LMP-04/07/2022. Pt has had 4 positive at home pregnancy tests. Pt voices for the past 2 days, she has been having spotting, menstrual like cramping, and lower back pain. Pt has not scheduled her missed menses appointment yet. Advised pt she should go to the [facility] for evaluation. Pt voices understanding.

## 2022-06-07 ENCOUNTER — OFFICE VISIT (OUTPATIENT)
Dept: OBGYN CLINIC | Facility: CLINIC | Age: 27
End: 2022-06-07
Payer: COMMERCIAL

## 2022-06-07 VITALS — WEIGHT: 135.19 LBS | SYSTOLIC BLOOD PRESSURE: 119 MMHG | DIASTOLIC BLOOD PRESSURE: 70 MMHG | BODY MASS INDEX: 23 KG/M2

## 2022-06-07 DIAGNOSIS — N91.2 AMENORRHEA: ICD-10-CM

## 2022-06-07 DIAGNOSIS — N92.6 MISSED MENSES: Primary | ICD-10-CM

## 2022-06-07 LAB
CONTROL LINE PRESENT WITH A CLEAR BACKGROUND (YES/NO): YES YES/NO
KIT LOT #: NORMAL NUMERIC
PREGNANCY TEST, URINE: POSITIVE

## 2022-06-07 PROCEDURE — 3078F DIAST BP <80 MM HG: CPT | Performed by: OBSTETRICS & GYNECOLOGY

## 2022-06-07 PROCEDURE — 81025 URINE PREGNANCY TEST: CPT | Performed by: OBSTETRICS & GYNECOLOGY

## 2022-06-07 PROCEDURE — 3074F SYST BP LT 130 MM HG: CPT | Performed by: OBSTETRICS & GYNECOLOGY

## 2022-06-07 PROCEDURE — 99214 OFFICE O/P EST MOD 30 MIN: CPT | Performed by: OBSTETRICS & GYNECOLOGY

## 2022-06-15 ENCOUNTER — OFFICE VISIT (OUTPATIENT)
Dept: OBGYN CLINIC | Facility: CLINIC | Age: 27
End: 2022-06-15
Payer: COMMERCIAL

## 2022-06-15 VITALS
HEIGHT: 64 IN | WEIGHT: 133.69 LBS | BODY MASS INDEX: 22.82 KG/M2 | DIASTOLIC BLOOD PRESSURE: 62 MMHG | SYSTOLIC BLOOD PRESSURE: 100 MMHG

## 2022-06-15 DIAGNOSIS — R82.90 ABNORMAL URINALYSIS: ICD-10-CM

## 2022-06-15 DIAGNOSIS — Z34.81 ENCOUNTER FOR SUPERVISION OF OTHER NORMAL PREGNANCY IN FIRST TRIMESTER: Primary | ICD-10-CM

## 2022-06-15 LAB
APPEARANCE: CLEAR
BILIRUBIN: NEGATIVE
GLUCOSE (URINE DIPSTICK): NEGATIVE MG/DL
KETONES (URINE DIPSTICK): NEGATIVE MG/DL
MULTISTIX LOT#: 1062 NUMERIC
NITRITE, URINE: NEGATIVE
PH, URINE: 7 (ref 4.5–8)
PROTEIN (URINE DIPSTICK): NEGATIVE MG/DL
SPECIFIC GRAVITY: 1.01 (ref 1–1.03)
URINE-COLOR: YELLOW
UROBILINOGEN,SEMI-QN: 0.2 MG/DL (ref 0–1.9)

## 2022-06-15 PROCEDURE — 3074F SYST BP LT 130 MM HG: CPT | Performed by: OBSTETRICS & GYNECOLOGY

## 2022-06-15 PROCEDURE — 81003 URINALYSIS AUTO W/O SCOPE: CPT | Performed by: OBSTETRICS & GYNECOLOGY

## 2022-06-15 PROCEDURE — 3008F BODY MASS INDEX DOCD: CPT | Performed by: OBSTETRICS & GYNECOLOGY

## 2022-06-15 PROCEDURE — 76816 OB US FOLLOW-UP PER FETUS: CPT | Performed by: OBSTETRICS & GYNECOLOGY

## 2022-06-15 PROCEDURE — 3078F DIAST BP <80 MM HG: CPT | Performed by: OBSTETRICS & GYNECOLOGY

## 2022-06-17 ENCOUNTER — TELEPHONE (OUTPATIENT)
Dept: OBGYN CLINIC | Facility: CLINIC | Age: 27
End: 2022-06-17

## 2022-06-18 NOTE — TELEPHONE ENCOUNTER
Patient: Estella Rao Date: 2019   : 1962 Attending: Sruthi Baca MD   56 year old female        Chief complaint:  ESRD, ischemic bowel s/p distal transverse colon resection    Subjective:   s/p extended R hemicolectomy with ileal to transverse colostomy on 19. Doing well. Remains npo. Denies sob. Off vasopressors.    Reviewed:  Data and notes    Vital Last Value 24 Hour Range   Temperature 99 °F (37.2 °C) (19 1230) Temp  Min: 98.4 °F (36.9 °C)  Max: 99.7 °F (37.6 °C)   Pulse 75 (19 1230) Pulse  Min: 72  Max: 84   Respiratory 29 (19 1230) Resp  Min: 5  Max: 37   Non-Invasive  Blood Pressure 114/50 (19 1230) BP  Min: 105/49  Max: 161/68   Arterial   Blood Pressure 150/50 (19 0930) Arterial Line BP  Min: 114/35  Max: 152/51   Pulse Oximetry 98 % (19 1230) SpO2  Min: 93 %  Max: 100 %     Vital Today Admitted   Weight 130.1 kg(Admission weight 125 kg) (19 0500) Weight: 125.3 kg (19 1150)   Height N/A Height: 5' (152.4 cm) (19 1150)   BMI N/A BMI (Calculated): 53.95 (19 1150)     Weight over the past 48 Hours:  Patient Vitals for the past 48 hrs:   Weight   19 0900 90.9 kg   19 0500 130.1 kg        Hemodynamics:      Last Value 24 Hour Range   CVP   No data recorded   PAS/PAD   No data recorded   PCWP   No data recorded   CO   No data recorded   CI   No data recorded   SV02   No data recorded     Intake/Output:    Last Stool Occurrence: 0 (19 1600)    No intake/output data recorded.    I/O last 3 completed shifts:  In: 1249 [I.V.:1023; IV Piggyback:226]  Out: 570 [Urine:335; Drains:235]      Intake/Output Summary (Last 24 hours) at 2019 1533  Last data filed at 2019 1430  Gross per 24 hour   Intake 1248.98 ml   Output 530 ml   Net 718.98 ml       Review of Systems:  As above    Medications/Infusions:  Scheduled:   • insulin glargine  16 Units Subcutaneous Daily   • insulin regular (human)   Subcutaneous 4  Pt counseled on neg urine culture. Pt feels well. No c/o. times per day   • heparin (porcine)  5,000 Units Subcutaneous 2 times per day   • levothyroxine  38 mcg Intravenous Daily   • albuterol  5 mg Nebulization Once   • cefepime (MAXIPIME) extended interval IVPB  2,000 mg Intravenous Q24H   • epoetin sade  10,000 Units Subcutaneous Once per day on Mon Wed Fri   • sodium chloride  500 mL Intravenous Once   • pantoprazole  40 mg Intravenous Nightly   • metroNIDAZOLE (FLAGYL) IVPB  500 mg Intravenous Q8H   • chlorhexidine gluconate  15 mL Swish & Spit 2 times per day       Continuous Infusions:   • dextrose 5 % infusion     • sodium chloride 0.9% infusion     • calcium gluconate 100 mEq in sodium chloride 0.9 % (total volume 1000 mL) infusion (CRRT with citrate anticoagulant) Stopped (07/08/19 1040)   • sodium chloride 0.9% infusion 10 mL/hr at 07/08/19 1638   • sodium chloride 0.9% infusion 10 mL/hr at 07/08/19 2218       Physical Exam:  NAD. On NC,NGT in  MM pale,no icterus  Neck supple. R IJ TDC,clean exit site  Normal s1 and s2,no rub  Lungs are clear anteriorly  Abd is soft, BS diminished. R sided drain.  Bilateral BKA  AAO x 3,non focal.    Laboratory Results:    Lab Results   Component Value Date    SODIUM 143 07/09/2019    POTASSIUM 3.9 07/09/2019    CHLORIDE 106 07/09/2019    CO2 27 07/09/2019    ANIONGAP 14 07/09/2019    BUN 28 (H) 07/09/2019    CREATININE 2.70 (H) 07/09/2019    CALCIUM 8.8 07/09/2019    MARGIE 1.15 07/08/2019    URIC 6.9 (H) 12/02/2014    GLUCOSE 132 (H) 07/09/2019    FIO2 40 07/08/2019    APH 7.40 07/08/2019    APCO2 43 07/08/2019    APO2 151 (H) 07/08/2019    AHCO3 26 07/08/2019    ASAT 98 07/08/2019     (L) 04/23/2019    CPK 38 08/25/2017    LIPA <50 (L) 07/05/2019    WBC 10.9 07/09/2019    HCT 30.8 (L) 07/09/2019     Lab Results   Component Value Date    HGB 9.2 (L) 07/09/2019     07/09/2019    INR 1.2 07/06/2019    PTT 32 07/05/2019    MG 2.2 07/09/2019    PHOS 3.5 07/09/2019    ALKPT 87 07/09/2019    BILIRUBIN 0.5 07/09/2019     CRP 2.7 (H) 10/05/2017    C3 160 12/02/2014    C4 18.0 12/02/2014    AST 9 07/09/2019    GPT 10 07/09/2019    ALBUMIN 2.8 (L) 07/09/2019    GFRA 22 07/09/2019    GFRNA 19 07/09/2019    INTAC 86 (H) 09/27/2017    LACTA 1.2 07/05/2019    PST 19 04/23/2019    STBLD NEGATIVE 09/10/2017    FERR 720 (H) 04/23/2019       Urine Panel  Lab Results   Component Value Date    UOSM 322 05/13/2011    STEPHANIE 22 05/13/2011    UKET NEGATIVE 08/26/2017    USPG 1.025 08/26/2017    UPROT 30 (A) 08/26/2017    UWBC SMALL (A) 08/26/2017    URBC TRACE (A) 08/26/2017    UBILI NEGATIVE 08/26/2017    UPH 5.0 08/26/2017    UBACTR NONE SEEN 08/26/2017    UTPELC 99 (H) 08/22/2017         Diagnosis/Plan:  1. ESRD : On HD TTS under the care of Dr. Sim Raman. Patient was on CVVH over the w/e. Just returned from surgery and appears stable clinically. Appears euvolemic.. Electrolytes are normal.  Schedule for HD tomorrow.    2. Anemia of CKD/ESRD :  Epogen 19480 units tiw.    3. DM II : On insulin drip.    4. Ischemic bowel : S/p extended R hemicolectomy with ileal to transverse colostomy 7/8/19. Stable.    D/w the CHRISTIE Singletary MD

## 2022-06-24 ENCOUNTER — HOSPITAL ENCOUNTER (OUTPATIENT)
Dept: ULTRASOUND IMAGING | Facility: HOSPITAL | Age: 27
Discharge: HOME | End: 2022-06-24
Attending: OBSTETRICS & GYNECOLOGY
Payer: COMMERCIAL

## 2022-06-24 ENCOUNTER — TELEPHONE (OUTPATIENT)
Dept: OBGYN CLINIC | Facility: CLINIC | Age: 27
End: 2022-06-24

## 2022-06-24 DIAGNOSIS — O26.851 SPOTTING DURING PREGNANCY IN FIRST TRIMESTER: Primary | ICD-10-CM

## 2022-06-24 DIAGNOSIS — O26.851 SPOTTING DURING PREGNANCY IN FIRST TRIMESTER: ICD-10-CM

## 2022-06-24 PROCEDURE — 76801 OB US < 14 WKS SINGLE FETUS: CPT | Performed by: OBSTETRICS & GYNECOLOGY

## 2022-06-24 NOTE — TELEPHONE ENCOUNTER
Pt said DR Esa Maldonado wanted her to do anther ultrasound in office in 2 weeks ,  This is for spoting and pt still is

## 2022-06-24 NOTE — TELEPHONE ENCOUNTER
OB History     T0    L0    SAB1  IAB0  Ectopic0  Multiple0  Live Births0   11w1d    Patient name and  verified. Patient complaining of spotting only while wiping. States spotting is most noticeable in am and describes color as pink-bright red. Denies pain or recent intercourse. Had in office ultrasound with ASJ on 6/15 and SIUP 7w4d noted. Per patient ASJ advised patient to call office if bleeding persists for repeat ultrasound. Soonest available appt is . Patient asking for radiology order for reassurance. Advised patinet to monitor bleeding, rest, and bleeding precautions reviewed with patient.  Please advise

## 2022-06-24 NOTE — TELEPHONE ENCOUNTER
Order for ob1 ultrasound entered in chart. Central scheduling number provided to patient and encouraged to schedule.

## 2022-06-25 ENCOUNTER — PATIENT MESSAGE (OUTPATIENT)
Dept: OBGYN CLINIC | Facility: CLINIC | Age: 27
End: 2022-06-25

## 2022-06-25 ENCOUNTER — TELEPHONE (OUTPATIENT)
Dept: OBGYN CLINIC | Facility: CLINIC | Age: 27
End: 2022-06-25

## 2022-06-25 NOTE — TELEPHONE ENCOUNTER
Patient name and  verified. Patient informed of ultrasound results. Patient questioning if  Melvena Portal is of any concern?  Would like message sent to provider please advise

## 2022-06-28 ENCOUNTER — TELEPHONE (OUTPATIENT)
Dept: OBGYN CLINIC | Facility: CLINIC | Age: 27
End: 2022-06-28

## 2022-06-28 NOTE — TELEPHONE ENCOUNTER
Patient name and  verified. Patient asking about follow up ultrasound for resolution of subchorionic hemorrhage. Advised patient next ultrasound is at 20 weeks and will be evaluated once again. Appt made for RN education visit on 2022.

## 2022-06-30 ENCOUNTER — TELEPHONE (OUTPATIENT)
Dept: OBGYN CLINIC | Facility: CLINIC | Age: 27
End: 2022-06-30

## 2022-06-30 ENCOUNTER — NURSE ONLY (OUTPATIENT)
Dept: OBGYN CLINIC | Facility: CLINIC | Age: 27
End: 2022-06-30
Payer: COMMERCIAL

## 2022-06-30 DIAGNOSIS — Z34.01 ENCOUNTER FOR SUPERVISION OF NORMAL FIRST PREGNANCY IN FIRST TRIMESTER: Primary | ICD-10-CM

## 2022-06-30 RX ORDER — CHOLECALCIFEROL (VITAMIN D3) 25 MCG
1 TABLET,CHEWABLE ORAL DAILY
COMMUNITY

## 2022-06-30 NOTE — PROGRESS NOTES
Pt is a  with EDC of 23 based on 22 first trimester OB ultrasound. LMP-2022. Pt with Hx subchorionic bleed with this pregnancy. Med Hx-groin cyst removal    OB Hx-EAB-, 2 SAB in  and . Telephone OB RN Education visit. Education materials reviewed with pt including, but not limited to: plan of care, safe medications, guidance on nutrition, travel, food safety, when to call the MD,ect    Pt agreeable to blood transfusion if needed. First trimester prenatal labs, Hcv, and Ferritin level ordered for pt. Pt counseled on the availability of genetic screenings including: cell free DNA, FTS w/US,Quad screen, MSAFP, and CF screening. Pt declined at this time. Media policy for [facility] reviewed with pt. EPDS score for today-0    Next New OB visit on 22.

## 2022-07-08 ENCOUNTER — OFFICE VISIT (OUTPATIENT)
Dept: OBGYN CLINIC | Facility: CLINIC | Age: 27
End: 2022-07-08
Payer: COMMERCIAL

## 2022-07-08 VITALS — BODY MASS INDEX: 23 KG/M2 | WEIGHT: 131.81 LBS | DIASTOLIC BLOOD PRESSURE: 70 MMHG | SYSTOLIC BLOOD PRESSURE: 112 MMHG

## 2022-07-08 DIAGNOSIS — Z34.82 ENCOUNTER FOR SUPERVISION OF OTHER NORMAL PREGNANCY IN SECOND TRIMESTER: Primary | ICD-10-CM

## 2022-07-08 DIAGNOSIS — R82.90 ABNORMAL URINE: ICD-10-CM

## 2022-07-08 LAB
APPEARANCE: CLEAR
BILIRUBIN: NEGATIVE
GLUCOSE (URINE DIPSTICK): NEGATIVE MG/DL
KETONES (URINE DIPSTICK): NEGATIVE MG/DL
MULTISTIX LOT#: ABNORMAL NUMERIC
NITRITE, URINE: NEGATIVE
PH, URINE: 8.5 (ref 4.5–8)
PROTEIN (URINE DIPSTICK): NEGATIVE MG/DL
SPECIFIC GRAVITY: 1.01 (ref 1–1.03)
URINE-COLOR: YELLOW
UROBILINOGEN,SEMI-QN: 0.2 MG/DL (ref 0–1.9)

## 2022-07-08 PROCEDURE — 3078F DIAST BP <80 MM HG: CPT | Performed by: OBSTETRICS & GYNECOLOGY

## 2022-07-08 PROCEDURE — 3074F SYST BP LT 130 MM HG: CPT | Performed by: OBSTETRICS & GYNECOLOGY

## 2022-07-08 PROCEDURE — 76817 TRANSVAGINAL US OBSTETRIC: CPT | Performed by: OBSTETRICS & GYNECOLOGY

## 2022-07-08 PROCEDURE — 81003 URINALYSIS AUTO W/O SCOPE: CPT | Performed by: OBSTETRICS & GYNECOLOGY

## 2022-07-11 LAB
C TRACH DNA SPEC QL NAA+PROBE: NEGATIVE
HPV I/H RISK 1 DNA SPEC QL NAA+PROBE: NEGATIVE
N GONORRHOEA DNA SPEC QL NAA+PROBE: NEGATIVE

## 2022-07-14 ENCOUNTER — TELEPHONE (OUTPATIENT)
Dept: PERINATAL CARE | Facility: HOSPITAL | Age: 27
End: 2022-07-14

## 2022-07-14 NOTE — TELEPHONE ENCOUNTER
Patient calling to schedule appointment. Patient informed no order entered. Suggested she reach out to [facility] office.   Patient agreed

## 2022-07-18 ENCOUNTER — TELEPHONE (OUTPATIENT)
Dept: OBGYN CLINIC | Facility: CLINIC | Age: 27
End: 2022-07-18

## 2022-07-18 DIAGNOSIS — Z34.81 ENCOUNTER FOR SUPERVISION OF OTHER NORMAL PREGNANCY IN FIRST TRIMESTER: Primary | ICD-10-CM

## 2022-07-19 ENCOUNTER — TELEPHONE (OUTPATIENT)
Dept: OBGYN CLINIC | Facility: CLINIC | Age: 27
End: 2022-07-19

## 2022-07-19 ENCOUNTER — TELEPHONE (OUTPATIENT)
Dept: PERINATAL CARE | Facility: HOSPITAL | Age: 27
End: 2022-07-19

## 2022-07-19 NOTE — TELEPHONE ENCOUNTER
Patient name and  verified. Patient unable to schedule within window with MFM. Patient informed of Chito SCOTT. Kishan kit left at  and ready for . Patient informed.

## 2022-07-19 NOTE — TELEPHONE ENCOUNTER
Patient name and  verified. Patient requesting MFM FTS ordered. MFM contact number provided to patient. Patient also requesting lab results. Reviewed pap results with patient. Patient states she is only experiencing vaginal discharge at this time.

## 2022-07-19 NOTE — TELEPHONE ENCOUNTER
Patient calling @ 12 3/7 to schedule FTS. Patient informed we do not have any available appointments before 7/29/22.   Suggested she contact OB office for alternative testing

## 2022-07-19 NOTE — TELEPHONE ENCOUNTER
Pt called [facility] to schedule US and they have no availability, pt was told to call the office.  Please advise

## 2022-07-23 ENCOUNTER — PATIENT MESSAGE (OUTPATIENT)
Dept: OBGYN CLINIC | Facility: CLINIC | Age: 27
End: 2022-07-23

## 2022-07-23 RX ORDER — METRONIDAZOLE 500 MG/1
500 TABLET ORAL 2 TIMES DAILY
Qty: 14 TABLET | Refills: 0 | Status: SHIPPED | OUTPATIENT
Start: 2022-07-23 | End: 2022-07-30

## 2022-07-25 NOTE — TELEPHONE ENCOUNTER
From: Raisa Valencia  To: Katiuska Judge. Ana Maria Servin, MD  Sent: 7/23/2022 2:24 PM CDT  Subject: Test results    Hello Julianne trying to call for my test results but the office is closed now. Please call me back.     Thank you  Twila Huntley  [phone]

## 2022-07-27 ENCOUNTER — TELEPHONE (OUTPATIENT)
Dept: OBGYN CLINIC | Facility: CLINIC | Age: 27
End: 2022-07-27

## 2022-07-27 NOTE — TELEPHONE ENCOUNTER
Incoming Fax received from [facility] with patient's Panorama test results. Routed to ASJ for review.      Sample collection date: 7/19/22  Report date: 7/27/22    Results: Low Risk for Aneuploidies and Microdeletions    Fetal Sex: Female  Fetal Fraction: 16.8%

## 2022-08-04 ENCOUNTER — ROUTINE PRENATAL (OUTPATIENT)
Dept: OBGYN CLINIC | Facility: CLINIC | Age: 27
End: 2022-08-04
Payer: COMMERCIAL

## 2022-08-04 ENCOUNTER — LAB ENCOUNTER (OUTPATIENT)
Dept: LAB | Facility: HOSPITAL | Age: 27
End: 2022-08-04
Attending: OBSTETRICS & GYNECOLOGY
Payer: COMMERCIAL

## 2022-08-04 VITALS — DIASTOLIC BLOOD PRESSURE: 68 MMHG | WEIGHT: 135.38 LBS | BODY MASS INDEX: 23 KG/M2 | SYSTOLIC BLOOD PRESSURE: 112 MMHG

## 2022-08-04 DIAGNOSIS — Z34.01 ENCOUNTER FOR SUPERVISION OF NORMAL FIRST PREGNANCY IN FIRST TRIMESTER: ICD-10-CM

## 2022-08-04 DIAGNOSIS — Z34.81 ENCOUNTER FOR SUPERVISION OF OTHER NORMAL PREGNANCY IN FIRST TRIMESTER: Primary | ICD-10-CM

## 2022-08-04 LAB
ANTIBODY SCREEN: NEGATIVE
APPEARANCE: CLEAR
BASOPHILS # BLD AUTO: 0.01 X10(3) UL (ref 0–0.2)
BASOPHILS NFR BLD AUTO: 0.1 %
BILIRUBIN: NEGATIVE
DEPRECATED HBV CORE AB SER IA-ACNC: 15.9 NG/ML
DEPRECATED RDW RBC AUTO: 45.2 FL (ref 35.1–46.3)
EOSINOPHIL # BLD AUTO: 0.16 X10(3) UL (ref 0–0.7)
EOSINOPHIL NFR BLD AUTO: 2 %
ERYTHROCYTE [DISTWIDTH] IN BLOOD BY AUTOMATED COUNT: 13.7 % (ref 11–15)
GLUCOSE (URINE DIPSTICK): NEGATIVE MG/DL
HBV SURFACE AG SER-ACNC: <0.1 [IU]/L
HBV SURFACE AG SERPL QL IA: NONREACTIVE
HCT VFR BLD AUTO: 36.7 %
HCV AB SERPL QL IA: NONREACTIVE
HGB BLD-MCNC: 12.4 G/DL
IMM GRANULOCYTES # BLD AUTO: 0.04 X10(3) UL (ref 0–1)
IMM GRANULOCYTES NFR BLD: 0.5 %
KETONES (URINE DIPSTICK): NEGATIVE MG/DL
LYMPHOCYTES # BLD AUTO: 1.62 X10(3) UL (ref 1–4)
LYMPHOCYTES NFR BLD AUTO: 19.9 %
MCH RBC QN AUTO: 30.5 PG (ref 26–34)
MCHC RBC AUTO-ENTMCNC: 33.8 G/DL (ref 31–37)
MCV RBC AUTO: 90.2 FL
MONOCYTES # BLD AUTO: 0.48 X10(3) UL (ref 0.1–1)
MONOCYTES NFR BLD AUTO: 5.9 %
MULTISTIX LOT#: ABNORMAL NUMERIC
NEUTROPHILS # BLD AUTO: 5.82 X10 (3) UL (ref 1.5–7.7)
NEUTROPHILS # BLD AUTO: 5.82 X10(3) UL (ref 1.5–7.7)
NEUTROPHILS NFR BLD AUTO: 71.6 %
NITRITE, URINE: NEGATIVE
OCCULT BLOOD: NEGATIVE
PH, URINE: 6.5 (ref 4.5–8)
PLATELET # BLD AUTO: 221 10(3)UL (ref 150–450)
PROTEIN (URINE DIPSTICK): NEGATIVE MG/DL
RBC # BLD AUTO: 4.07 X10(6)UL
RH BLOOD TYPE: POSITIVE
RUBV IGG SER QL: POSITIVE
RUBV IGG SER-ACNC: 48.6 IU/ML (ref 10–?)
SPECIFIC GRAVITY: 1 (ref 1–1.03)
URINE-COLOR: YELLOW
UROBILINOGEN,SEMI-QN: 0.2 MG/DL (ref 0–1.9)
WBC # BLD AUTO: 8.1 X10(3) UL (ref 4–11)

## 2022-08-04 PROCEDURE — 3074F SYST BP LT 130 MM HG: CPT | Performed by: OBSTETRICS & GYNECOLOGY

## 2022-08-04 PROCEDURE — 36415 COLL VENOUS BLD VENIPUNCTURE: CPT

## 2022-08-04 PROCEDURE — 3078F DIAST BP <80 MM HG: CPT | Performed by: OBSTETRICS & GYNECOLOGY

## 2022-08-04 PROCEDURE — 86901 BLOOD TYPING SEROLOGIC RH(D): CPT

## 2022-08-04 PROCEDURE — 86762 RUBELLA ANTIBODY: CPT

## 2022-08-04 PROCEDURE — 86850 RBC ANTIBODY SCREEN: CPT

## 2022-08-04 PROCEDURE — 87389 HIV-1 AG W/HIV-1&-2 AB AG IA: CPT

## 2022-08-04 PROCEDURE — 82728 ASSAY OF FERRITIN: CPT

## 2022-08-04 PROCEDURE — 85025 COMPLETE CBC W/AUTO DIFF WBC: CPT

## 2022-08-04 PROCEDURE — 86777 TOXOPLASMA ANTIBODY: CPT

## 2022-08-04 PROCEDURE — 87340 HEPATITIS B SURFACE AG IA: CPT

## 2022-08-04 PROCEDURE — 86803 HEPATITIS C AB TEST: CPT

## 2022-08-04 PROCEDURE — 86780 TREPONEMA PALLIDUM: CPT

## 2022-08-04 PROCEDURE — 86778 TOXOPLASMA ANTIBODY IGM: CPT

## 2022-08-04 PROCEDURE — 86900 BLOOD TYPING SEROLOGIC ABO: CPT

## 2022-08-04 PROCEDURE — 81002 URINALYSIS NONAUTO W/O SCOPE: CPT | Performed by: OBSTETRICS & GYNECOLOGY

## 2022-08-04 NOTE — PROGRESS NOTES
Patient needs pap smear today. LPS was Unsatisfactory. Treated for BV. Encouraged to do prenatal Labs today.

## 2022-08-05 LAB
HPV I/H RISK 1 DNA SPEC QL NAA+PROBE: NEGATIVE
T PALLIDUM AB SER QL: NEGATIVE

## 2022-08-06 LAB
TOXOPLASMA GONDII AB, IGG: <3 IU/ML
TOXOPLASMA GONDII AB, IGM: <3 AU/ML

## 2022-08-12 ENCOUNTER — TELEPHONE (OUTPATIENT)
Dept: OBGYN CLINIC | Facility: CLINIC | Age: 27
End: 2022-08-12

## 2022-08-15 NOTE — TELEPHONE ENCOUNTER
Patient name and  verified. Patient informed pap is still in process. Aware vaginal culture was not completed and would be a best to confirm BV. Denies symptoms now. Advised pap can take at most 14 days to complete and should await result and recommendations from provider. Verbalized understanding.

## 2022-09-02 ENCOUNTER — ROUTINE PRENATAL (OUTPATIENT)
Dept: OBGYN CLINIC | Facility: CLINIC | Age: 27
End: 2022-09-02
Payer: COMMERCIAL

## 2022-09-02 VITALS
BODY MASS INDEX: 24 KG/M2 | WEIGHT: 139.81 LBS | HEART RATE: 78 BPM | DIASTOLIC BLOOD PRESSURE: 67 MMHG | SYSTOLIC BLOOD PRESSURE: 110 MMHG

## 2022-09-02 DIAGNOSIS — Z34.81 ENCOUNTER FOR SUPERVISION OF OTHER NORMAL PREGNANCY IN FIRST TRIMESTER: Primary | ICD-10-CM

## 2022-09-02 LAB
APPEARANCE: CLEAR
BILIRUBIN: NEGATIVE
GLUCOSE (URINE DIPSTICK): NEGATIVE MG/DL
KETONES (URINE DIPSTICK): NEGATIVE MG/DL
LEUKOCYTES: NEGATIVE
MULTISTIX EXPIRATION DATE: NORMAL DATE
MULTISTIX LOT#: NORMAL NUMERIC
NITRITE, URINE: NEGATIVE
OCCULT BLOOD: NEGATIVE
PH, URINE: 7 (ref 4.5–8)
PROTEIN (URINE DIPSTICK): NEGATIVE MG/DL
SPECIFIC GRAVITY: 1.01 (ref 1–1.03)
URINE-COLOR: YELLOW
UROBILINOGEN,SEMI-QN: 0.2 MG/DL (ref 0–1.9)

## 2022-09-02 PROCEDURE — 81003 URINALYSIS AUTO W/O SCOPE: CPT | Performed by: OBSTETRICS & GYNECOLOGY

## 2022-09-02 PROCEDURE — 3074F SYST BP LT 130 MM HG: CPT | Performed by: OBSTETRICS & GYNECOLOGY

## 2022-09-02 PROCEDURE — 3078F DIAST BP <80 MM HG: CPT | Performed by: OBSTETRICS & GYNECOLOGY

## 2022-09-26 ENCOUNTER — TELEPHONE (OUTPATIENT)
Dept: OBGYN CLINIC | Facility: CLINIC | Age: 27
End: 2022-09-26

## 2022-09-26 NOTE — TELEPHONE ENCOUNTER
OB History     T0    L0    SAB2  IAB1  Ectopic0  Multiple0  Live Births0   22w2d    Patient name and  verified. Patient states family wants to travel to [place] in December. Patient unsure of when exactly. Travel precautions reviewed with patient as listed in ob packet. Verbalized understanding.

## 2022-09-26 NOTE — TELEPHONE ENCOUNTER
Pt parents are planning on traveling in beginning of December by Plane to [place]. Please advise if she should or not.

## 2022-09-27 ENCOUNTER — HOSPITAL ENCOUNTER (OUTPATIENT)
Dept: ULTRASOUND IMAGING | Age: 27
Discharge: HOME | End: 2022-09-27
Attending: OBSTETRICS & GYNECOLOGY

## 2022-09-27 DIAGNOSIS — Z34.81 ENCOUNTER FOR SUPERVISION OF OTHER NORMAL PREGNANCY IN FIRST TRIMESTER: ICD-10-CM

## 2022-09-27 PROCEDURE — 76805 OB US >/= 14 WKS SNGL FETUS: CPT | Performed by: OBSTETRICS & GYNECOLOGY

## 2022-09-30 ENCOUNTER — TELEPHONE (OUTPATIENT)
Dept: OBGYN CLINIC | Facility: CLINIC | Age: 27
End: 2022-09-30

## 2022-09-30 NOTE — TELEPHONE ENCOUNTER
Pt dropped [place] paperwork for her pregnancy. Pt states it needs to start before her due date as she needs to have intermittent leave. Start date should be 9/18/2022. Forms were scanned to [facility] and originals were inter officed to [facility]. Janette Canada was signed and fee was collected.

## 2022-10-04 NOTE — TELEPHONE ENCOUNTER
Dr. Blake Mccray      Please sign off on form: FMLA   -Highlight the patient and hit \"Chart\" button. -In Chart Review, w/in the Encounter tab - click 1 time on the Telephone call encounter for 9/30/22 Scroll down the telephone encounter.  -Click \"scan on\" blue Hyperlink under \"Media\" heading for FMLA Dr Blake Mccray 10/4/22  w/in the telephone enc.  -Click on Acknowledge button at the bottom right corner and left-click onto image, signature stamp appears and drag signature to Provider signature line. Stamp will turn blue. Close window.      Thank you,    Harry Gama

## 2022-10-07 ENCOUNTER — ROUTINE PRENATAL (OUTPATIENT)
Dept: OBGYN CLINIC | Facility: CLINIC | Age: 27
End: 2022-10-07
Payer: COMMERCIAL

## 2022-10-07 VITALS — BODY MASS INDEX: 25 KG/M2 | WEIGHT: 145 LBS | SYSTOLIC BLOOD PRESSURE: 102 MMHG | DIASTOLIC BLOOD PRESSURE: 60 MMHG

## 2022-10-07 DIAGNOSIS — Z34.81 ENCOUNTER FOR SUPERVISION OF OTHER NORMAL PREGNANCY IN FIRST TRIMESTER: Primary | ICD-10-CM

## 2022-10-07 DIAGNOSIS — Z28.21 INFLUENZA VACCINE REFUSED: ICD-10-CM

## 2022-10-07 LAB
APPEARANCE: CLEAR
BILIRUBIN: NEGATIVE
GLUCOSE (URINE DIPSTICK): NEGATIVE MG/DL
KETONES (URINE DIPSTICK): NEGATIVE MG/DL
MULTISTIX LOT#: ABNORMAL NUMERIC
NITRITE, URINE: NEGATIVE
OCCULT BLOOD: NEGATIVE
PH, URINE: 6.5 (ref 4.5–8)
PROTEIN (URINE DIPSTICK): NEGATIVE MG/DL
SPECIFIC GRAVITY: 1.02 (ref 1–1.03)
URINE-COLOR: YELLOW
UROBILINOGEN,SEMI-QN: 0.2 MG/DL (ref 0–1.9)

## 2022-10-07 PROCEDURE — 81003 URINALYSIS AUTO W/O SCOPE: CPT | Performed by: OBSTETRICS & GYNECOLOGY

## 2022-10-07 PROCEDURE — 3078F DIAST BP <80 MM HG: CPT | Performed by: OBSTETRICS & GYNECOLOGY

## 2022-10-07 PROCEDURE — 3074F SYST BP LT 130 MM HG: CPT | Performed by: OBSTETRICS & GYNECOLOGY

## 2022-10-10 NOTE — TELEPHONE ENCOUNTER
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 Pt has missed one day of work due to the back pain and her job is requiring her to complete FMLA. Pt it requesting 1 flare up a month lasting 1 full day. Do you support?

## 2022-10-28 ENCOUNTER — ROUTINE PRENATAL (OUTPATIENT)
Dept: OBGYN CLINIC | Facility: CLINIC | Age: 27
End: 2022-10-28
Payer: COMMERCIAL

## 2022-10-28 VITALS — BODY MASS INDEX: 26 KG/M2 | SYSTOLIC BLOOD PRESSURE: 104 MMHG | WEIGHT: 150.38 LBS | DIASTOLIC BLOOD PRESSURE: 76 MMHG

## 2022-10-28 DIAGNOSIS — Z34.82 ENCOUNTER FOR SUPERVISION OF OTHER NORMAL PREGNANCY IN SECOND TRIMESTER: Primary | ICD-10-CM

## 2022-10-28 LAB
BILIRUBIN: NEGATIVE
GLUCOSE (URINE DIPSTICK): NEGATIVE MG/DL
KETONES (URINE DIPSTICK): NEGATIVE MG/DL
MULTISTIX LOT#: ABNORMAL NUMERIC
NITRITE, URINE: NEGATIVE
OCCULT BLOOD: NEGATIVE
PH, URINE: 5 (ref 4.5–8)
PROTEIN (URINE DIPSTICK): NEGATIVE MG/DL
SPECIFIC GRAVITY: 1 (ref 1–1.03)
URINE-COLOR: YELLOW
UROBILINOGEN,SEMI-QN: 0.2 MG/DL (ref 0–1.9)

## 2022-10-28 RX ORDER — BREAST PUMP
EACH MISCELLANEOUS
Qty: 1 EACH | Refills: 0 | Status: SHIPPED | OUTPATIENT
Start: 2022-10-28

## 2022-10-31 ENCOUNTER — LAB ENCOUNTER (OUTPATIENT)
Dept: LAB | Facility: HOSPITAL | Age: 27
End: 2022-10-31
Attending: OBSTETRICS & GYNECOLOGY
Payer: COMMERCIAL

## 2022-10-31 DIAGNOSIS — Z34.81 ENCOUNTER FOR SUPERVISION OF OTHER NORMAL PREGNANCY IN FIRST TRIMESTER: ICD-10-CM

## 2022-10-31 LAB
BASOPHILS # BLD AUTO: 0.03 X10(3) UL (ref 0–0.2)
BASOPHILS NFR BLD AUTO: 0.4 %
DEPRECATED HBV CORE AB SER IA-ACNC: 5.6 NG/ML
DEPRECATED RDW RBC AUTO: 45.1 FL (ref 35.1–46.3)
EOSINOPHIL # BLD AUTO: 0.21 X10(3) UL (ref 0–0.7)
EOSINOPHIL NFR BLD AUTO: 2.7 %
ERYTHROCYTE [DISTWIDTH] IN BLOOD BY AUTOMATED COUNT: 13.2 % (ref 11–15)
GLUCOSE 1H P GLC SERPL-MCNC: 121 MG/DL
HCT VFR BLD AUTO: 34.5 %
HGB BLD-MCNC: 11.6 G/DL
IMM GRANULOCYTES # BLD AUTO: 0.08 X10(3) UL (ref 0–1)
IMM GRANULOCYTES NFR BLD: 1 %
LYMPHOCYTES # BLD AUTO: 2.05 X10(3) UL (ref 1–4)
LYMPHOCYTES NFR BLD AUTO: 26.1 %
MCH RBC QN AUTO: 31.4 PG (ref 26–34)
MCHC RBC AUTO-ENTMCNC: 33.6 G/DL (ref 31–37)
MCV RBC AUTO: 93.5 FL
MONOCYTES # BLD AUTO: 0.69 X10(3) UL (ref 0.1–1)
MONOCYTES NFR BLD AUTO: 8.8 %
NEUTROPHILS # BLD AUTO: 4.8 X10 (3) UL (ref 1.5–7.7)
NEUTROPHILS # BLD AUTO: 4.8 X10(3) UL (ref 1.5–7.7)
NEUTROPHILS NFR BLD AUTO: 61 %
PLATELET # BLD AUTO: 227 10(3)UL (ref 150–450)
RBC # BLD AUTO: 3.69 X10(6)UL
WBC # BLD AUTO: 7.9 X10(3) UL (ref 4–11)

## 2022-10-31 PROCEDURE — 82728 ASSAY OF FERRITIN: CPT

## 2022-10-31 PROCEDURE — 82950 GLUCOSE TEST: CPT

## 2022-10-31 PROCEDURE — 85025 COMPLETE CBC W/AUTO DIFF WBC: CPT

## 2022-10-31 PROCEDURE — 36415 COLL VENOUS BLD VENIPUNCTURE: CPT

## 2022-11-01 RX ORDER — IRON, FOLIC ACID, CYANOCOBALAMIN, ASCORBIC ACID, AND DOCUSATE SODIUM 90; 1; 12; 120; 50 MG/1; MG/1; UG/1; MG/1; MG/1
1 TABLET, FILM COATED ORAL EVERY OTHER DAY
Qty: 45 TABLET | Refills: 3 | Status: SHIPPED | OUTPATIENT
Start: 2022-11-01 | End: 2022-12-01

## 2022-11-08 ENCOUNTER — OFFICE VISIT (OUTPATIENT)
Dept: FAMILY MEDICINE CLINIC | Facility: CLINIC | Age: 27
End: 2022-11-08
Payer: COMMERCIAL

## 2022-11-08 ENCOUNTER — TELEPHONE (OUTPATIENT)
Dept: PEDIATRICS CLINIC | Facility: CLINIC | Age: 27
End: 2022-11-08

## 2022-11-08 VITALS
HEART RATE: 80 BPM | SYSTOLIC BLOOD PRESSURE: 107 MMHG | BODY MASS INDEX: 25.83 KG/M2 | WEIGHT: 155 LBS | DIASTOLIC BLOOD PRESSURE: 67 MMHG | HEIGHT: 65 IN | RESPIRATION RATE: 20 BRPM | TEMPERATURE: 98 F

## 2022-11-08 DIAGNOSIS — H00.011 HORDEOLUM EXTERNUM OF RIGHT UPPER EYELID: Primary | ICD-10-CM

## 2022-11-08 PROCEDURE — 3008F BODY MASS INDEX DOCD: CPT | Performed by: FAMILY MEDICINE

## 2022-11-08 PROCEDURE — 99213 OFFICE O/P EST LOW 20 MIN: CPT | Performed by: FAMILY MEDICINE

## 2022-11-08 PROCEDURE — 3074F SYST BP LT 130 MM HG: CPT | Performed by: FAMILY MEDICINE

## 2022-11-08 PROCEDURE — 3078F DIAST BP <80 MM HG: CPT | Performed by: FAMILY MEDICINE

## 2022-11-08 RX ORDER — ERYTHROMYCIN 5 MG/G
OINTMENT OPHTHALMIC
Qty: 1 EACH | Refills: 0 | Status: SHIPPED | OUTPATIENT
Start: 2022-11-08

## 2022-11-11 ENCOUNTER — ROUTINE PRENATAL (OUTPATIENT)
Dept: OBGYN CLINIC | Facility: CLINIC | Age: 27
End: 2022-11-11
Payer: COMMERCIAL

## 2022-11-11 VITALS — WEIGHT: 153.81 LBS | DIASTOLIC BLOOD PRESSURE: 72 MMHG | SYSTOLIC BLOOD PRESSURE: 104 MMHG | BODY MASS INDEX: 26 KG/M2

## 2022-11-11 DIAGNOSIS — Z34.82 ENCOUNTER FOR SUPERVISION OF OTHER NORMAL PREGNANCY IN SECOND TRIMESTER: Primary | ICD-10-CM

## 2022-11-11 LAB
APPEARANCE: CLEAR
BILIRUBIN: NEGATIVE
GLUCOSE (URINE DIPSTICK): NEGATIVE MG/DL
KETONES (URINE DIPSTICK): NEGATIVE MG/DL
LEUKOCYTES: NEGATIVE
MULTISTIX LOT#: NORMAL NUMERIC
NITRITE, URINE: NEGATIVE
OCCULT BLOOD: NEGATIVE
PH, URINE: 5 (ref 4.5–8)
PROTEIN (URINE DIPSTICK): NEGATIVE MG/DL
SPECIFIC GRAVITY: 1.01 (ref 1–1.03)
URINE-COLOR: YELLOW
UROBILINOGEN,SEMI-QN: 0.2 MG/DL (ref 0–1.9)

## 2022-11-11 PROCEDURE — 0502F SUBSEQUENT PRENATAL CARE: CPT | Performed by: OBSTETRICS & GYNECOLOGY

## 2022-11-11 PROCEDURE — 3078F DIAST BP <80 MM HG: CPT | Performed by: OBSTETRICS & GYNECOLOGY

## 2022-11-11 PROCEDURE — 3074F SYST BP LT 130 MM HG: CPT | Performed by: OBSTETRICS & GYNECOLOGY

## 2022-11-11 PROCEDURE — 81002 URINALYSIS NONAUTO W/O SCOPE: CPT | Performed by: OBSTETRICS & GYNECOLOGY

## 2022-11-11 PROCEDURE — 90471 IMMUNIZATION ADMIN: CPT | Performed by: OBSTETRICS & GYNECOLOGY

## 2022-11-11 PROCEDURE — 90715 TDAP VACCINE 7 YRS/> IM: CPT | Performed by: OBSTETRICS & GYNECOLOGY

## 2022-11-11 NOTE — PROGRESS NOTES
No C/Os. Discussed benefits of Flu vaccine and Tdap. Patient agrees to Tdap today and will think about Flu vaccine for next visit.

## 2022-11-14 ENCOUNTER — TELEPHONE (OUTPATIENT)
Dept: OBGYN CLINIC | Facility: CLINIC | Age: 27
End: 2022-11-14

## 2022-11-14 ENCOUNTER — LAB ENCOUNTER (OUTPATIENT)
Dept: LAB | Age: 27
End: 2022-11-14
Attending: OBSTETRICS & GYNECOLOGY
Payer: COMMERCIAL

## 2022-11-14 DIAGNOSIS — B34.9 VIRAL SYNDROME: ICD-10-CM

## 2022-11-14 DIAGNOSIS — B34.9 VIRAL SYNDROME: Primary | ICD-10-CM

## 2022-11-14 NOTE — TELEPHONE ENCOUNTER
Pt states that she is pregnant and she wants to know what she can safely take for a Cold and fever? Pt. States that she is not sure if she has cold symptoms due to a side effect after get TDAP this past Friday?

## 2022-11-14 NOTE — TELEPHONE ENCOUNTER
Per pt on Saturday started feeling congestion, fever of 100.3, cough, chest pain when she coughs. Had the tdap on Friday unsure if related. Per pt had a message from work on wednesday that someone had been diagnosed with covid. Pt informed order for covid test would be placed in her chart. Number given to call and schedule. Pt advised she could take Tylenol for pain and fever and robitussin for cough. Pt to rest and hydrate. Pt to go to ER with significant shortness of breath. Pt requesting a work note for missing work yesterday and today.

## 2022-11-14 NOTE — TELEPHONE ENCOUNTER
Pt requesting to get a excuse note for work, as she has a cold an did not go to work yesterday and today, due to symptoms. Please send note to [address] [place] [address] [address].

## 2022-11-16 ENCOUNTER — TELEPHONE (OUTPATIENT)
Dept: OBGYN CLINIC | Facility: CLINIC | Age: 27
End: 2022-11-16

## 2022-11-16 LAB — SARS-COV-2 RNA RESP QL NAA+PROBE: NOT DETECTED

## 2022-11-16 NOTE — TELEPHONE ENCOUNTER
Pt 29w 4d  Pt asking for note to excuse you from work on 11/13 & 11/14. Pt had a Covid test done that turned out neg. Pt returned to work on 11/16. Pt asking for note to be posted to [place].   Pls advise

## 2022-11-17 NOTE — TELEPHONE ENCOUNTER
Patient requesting revised note to excuse her for work explaining that she was sick and missed work 11/13, and 11/14. Patient was negative for covid. Please send through [employer], thanks.

## 2022-11-24 ENCOUNTER — HOSPITAL ENCOUNTER (OUTPATIENT)
Facility: HOSPITAL | Age: 27
Discharge: HOME | End: 2022-11-24
Attending: OBSTETRICS & GYNECOLOGY | Admitting: OBSTETRICS & GYNECOLOGY
Payer: COMMERCIAL

## 2022-11-24 VITALS — TEMPERATURE: 99 F | DIASTOLIC BLOOD PRESSURE: 64 MMHG | HEART RATE: 80 BPM | SYSTOLIC BLOOD PRESSURE: 120 MMHG

## 2022-11-24 PROCEDURE — 59025 FETAL NON-STRESS TEST: CPT | Performed by: OBSTETRICS & GYNECOLOGY

## 2022-11-29 ENCOUNTER — ROUTINE PRENATAL (OUTPATIENT)
Dept: OBGYN CLINIC | Facility: CLINIC | Age: 27
End: 2022-11-29
Payer: COMMERCIAL

## 2022-11-29 VITALS
WEIGHT: 156 LBS | DIASTOLIC BLOOD PRESSURE: 68 MMHG | HEART RATE: 80 BPM | SYSTOLIC BLOOD PRESSURE: 106 MMHG | BODY MASS INDEX: 26 KG/M2

## 2022-11-29 DIAGNOSIS — Z34.03 ENCOUNTER FOR SUPERVISION OF NORMAL FIRST PREGNANCY IN THIRD TRIMESTER: Primary | ICD-10-CM

## 2022-11-29 LAB
APPEARANCE: CLEAR
BILIRUBIN: NEGATIVE
GLUCOSE (URINE DIPSTICK): NEGATIVE MG/DL
KETONES (URINE DIPSTICK): NEGATIVE MG/DL
LEUKOCYTES: NEGATIVE
MULTISTIX EXPIRATION DATE: NORMAL DATE
MULTISTIX LOT#: NORMAL NUMERIC
NITRITE, URINE: NEGATIVE
OCCULT BLOOD: NEGATIVE
PH, URINE: 7 (ref 4.5–8)
PROTEIN (URINE DIPSTICK): NEGATIVE MG/DL
SPECIFIC GRAVITY: 1.02 (ref 1–1.03)
URINE-COLOR: YELLOW
UROBILINOGEN,SEMI-QN: 0.2 MG/DL (ref 0–1.9)

## 2022-11-29 PROCEDURE — 3078F DIAST BP <80 MM HG: CPT | Performed by: OBSTETRICS & GYNECOLOGY

## 2022-11-29 PROCEDURE — 81003 URINALYSIS AUTO W/O SCOPE: CPT | Performed by: OBSTETRICS & GYNECOLOGY

## 2022-11-29 PROCEDURE — 3074F SYST BP LT 130 MM HG: CPT | Performed by: OBSTETRICS & GYNECOLOGY

## 2022-11-29 NOTE — PROGRESS NOTES
No C/Os. Reports was in [facility] on Saturday with decreased fetal movements. Fetus moving well now.

## 2022-12-19 ENCOUNTER — TELEPHONE (OUTPATIENT)
Dept: OBGYN CLINIC | Facility: CLINIC | Age: 27
End: 2022-12-19

## 2022-12-19 NOTE — TELEPHONE ENCOUNTER
Patient would like to get documentation for her job that would dictate she should be working less hours. Please advise.

## 2022-12-19 NOTE — TELEPHONE ENCOUNTER
OB History     T0    L0    SAB2  IAB1  Ectopic0  Multiple0  Live Births0   34w2d    Patient name and  verified. Patient requesting work note to cut down her hours from 40 to 28-30 hours per week. Patient states she is getting tired more now and is on her feet at work.  Routing to provider for approval.

## 2022-12-27 ENCOUNTER — ROUTINE PRENATAL (OUTPATIENT)
Dept: OBGYN CLINIC | Facility: CLINIC | Age: 27
End: 2022-12-27
Payer: COMMERCIAL

## 2022-12-27 VITALS — SYSTOLIC BLOOD PRESSURE: 104 MMHG | WEIGHT: 160.19 LBS | BODY MASS INDEX: 27 KG/M2 | DIASTOLIC BLOOD PRESSURE: 78 MMHG

## 2022-12-27 DIAGNOSIS — Z34.83 ENCOUNTER FOR SUPERVISION OF OTHER NORMAL PREGNANCY IN THIRD TRIMESTER: Primary | ICD-10-CM

## 2022-12-27 LAB
BILIRUBIN: NEGATIVE
GLUCOSE (URINE DIPSTICK): NEGATIVE MG/DL
KETONES (URINE DIPSTICK): NEGATIVE MG/DL
MULTISTIX LOT#: ABNORMAL NUMERIC
NITRITE, URINE: NEGATIVE
OCCULT BLOOD: NEGATIVE
PH, URINE: 6 (ref 4.5–8)
PROTEIN (URINE DIPSTICK): NEGATIVE MG/DL
SPECIFIC GRAVITY: 1 (ref 1–1.03)
URINE-COLOR: YELLOW
UROBILINOGEN,SEMI-QN: 0.2 MG/DL (ref 0–1.9)

## 2022-12-27 PROCEDURE — 3074F SYST BP LT 130 MM HG: CPT | Performed by: OBSTETRICS & GYNECOLOGY

## 2022-12-27 PROCEDURE — 3078F DIAST BP <80 MM HG: CPT | Performed by: OBSTETRICS & GYNECOLOGY

## 2022-12-27 PROCEDURE — 81002 URINALYSIS NONAUTO W/O SCOPE: CPT | Performed by: OBSTETRICS & GYNECOLOGY

## 2022-12-30 ENCOUNTER — LAB ENCOUNTER (OUTPATIENT)
Dept: LAB | Facility: HOSPITAL | Age: 27
End: 2022-12-30
Attending: OBSTETRICS & GYNECOLOGY
Payer: COMMERCIAL

## 2022-12-30 DIAGNOSIS — Z34.83 ENCOUNTER FOR SUPERVISION OF OTHER NORMAL PREGNANCY IN THIRD TRIMESTER: ICD-10-CM

## 2022-12-30 LAB
BASOPHILS # BLD AUTO: 0.04 X10(3) UL (ref 0–0.2)
BASOPHILS NFR BLD AUTO: 0.5 %
DEPRECATED HBV CORE AB SER IA-ACNC: 15.5 NG/ML
DEPRECATED RDW RBC AUTO: 47.9 FL (ref 35.1–46.3)
EOSINOPHIL # BLD AUTO: 0.33 X10(3) UL (ref 0–0.7)
EOSINOPHIL NFR BLD AUTO: 3.7 %
ERYTHROCYTE [DISTWIDTH] IN BLOOD BY AUTOMATED COUNT: 13.9 % (ref 11–15)
HCT VFR BLD AUTO: 37.8 %
HGB BLD-MCNC: 12.4 G/DL
IMM GRANULOCYTES # BLD AUTO: 0.13 X10(3) UL (ref 0–1)
IMM GRANULOCYTES NFR BLD: 1.5 %
LYMPHOCYTES # BLD AUTO: 1.66 X10(3) UL (ref 1–4)
LYMPHOCYTES NFR BLD AUTO: 18.8 %
MCH RBC QN AUTO: 30.7 PG (ref 26–34)
MCHC RBC AUTO-ENTMCNC: 32.8 G/DL (ref 31–37)
MCV RBC AUTO: 93.6 FL
MONOCYTES # BLD AUTO: 0.73 X10(3) UL (ref 0.1–1)
MONOCYTES NFR BLD AUTO: 8.3 %
NEUTROPHILS # BLD AUTO: 5.93 X10 (3) UL (ref 1.5–7.7)
NEUTROPHILS # BLD AUTO: 5.93 X10(3) UL (ref 1.5–7.7)
NEUTROPHILS NFR BLD AUTO: 67.2 %
PLATELET # BLD AUTO: 235 10(3)UL (ref 150–450)
RBC # BLD AUTO: 4.04 X10(6)UL
WBC # BLD AUTO: 8.8 X10(3) UL (ref 4–11)

## 2022-12-30 PROCEDURE — 82728 ASSAY OF FERRITIN: CPT

## 2022-12-30 PROCEDURE — 86780 TREPONEMA PALLIDUM: CPT

## 2022-12-30 PROCEDURE — 36415 COLL VENOUS BLD VENIPUNCTURE: CPT

## 2022-12-30 PROCEDURE — 85025 COMPLETE CBC W/AUTO DIFF WBC: CPT

## 2022-12-30 PROCEDURE — 87389 HIV-1 AG W/HIV-1&-2 AB AG IA: CPT

## 2023-01-02 LAB — T PALLIDUM AB SER QL: NEGATIVE

## 2023-01-03 ENCOUNTER — ROUTINE PRENATAL (OUTPATIENT)
Dept: OBGYN CLINIC | Facility: CLINIC | Age: 28
End: 2023-01-03
Payer: COMMERCIAL

## 2023-01-03 VITALS — SYSTOLIC BLOOD PRESSURE: 108 MMHG | WEIGHT: 161 LBS | BODY MASS INDEX: 27 KG/M2 | DIASTOLIC BLOOD PRESSURE: 74 MMHG

## 2023-01-03 DIAGNOSIS — Z34.83 ENCOUNTER FOR SUPERVISION OF OTHER NORMAL PREGNANCY IN THIRD TRIMESTER: Primary | ICD-10-CM

## 2023-01-03 LAB
BILIRUBIN: NEGATIVE
GLUCOSE (URINE DIPSTICK): NEGATIVE MG/DL
KETONES (URINE DIPSTICK): NEGATIVE MG/DL
LEUKOCYTES: NEGATIVE
MULTISTIX LOT#: NORMAL NUMERIC
NITRITE, URINE: NEGATIVE
OCCULT BLOOD: NEGATIVE
PH, URINE: 7 (ref 4.5–8)
PROTEIN (URINE DIPSTICK): NEGATIVE MG/DL
SPECIFIC GRAVITY: 1 (ref 1–1.03)
URINE-COLOR: YELLOW
UROBILINOGEN,SEMI-QN: 0.2 MG/DL (ref 0–1.9)

## 2023-01-03 PROCEDURE — 81002 URINALYSIS NONAUTO W/O SCOPE: CPT | Performed by: OBSTETRICS & GYNECOLOGY

## 2023-01-03 PROCEDURE — 3074F SYST BP LT 130 MM HG: CPT | Performed by: OBSTETRICS & GYNECOLOGY

## 2023-01-03 PROCEDURE — 3078F DIAST BP <80 MM HG: CPT | Performed by: OBSTETRICS & GYNECOLOGY

## 2023-01-03 NOTE — PROGRESS NOTES
No C/Os. Labs reviewed. To continue with additional Iron. GBBS Culture Done. Labor Precautions reviewed.

## 2023-01-05 LAB — GROUP B STREP BY PCR FOR PCR OVT: NEGATIVE

## 2023-01-10 ENCOUNTER — ROUTINE PRENATAL (OUTPATIENT)
Dept: OBGYN CLINIC | Facility: CLINIC | Age: 28
End: 2023-01-10
Payer: COMMERCIAL

## 2023-01-10 VITALS — SYSTOLIC BLOOD PRESSURE: 123 MMHG | WEIGHT: 162.81 LBS | DIASTOLIC BLOOD PRESSURE: 73 MMHG | BODY MASS INDEX: 27 KG/M2

## 2023-01-10 DIAGNOSIS — Z34.83 ENCOUNTER FOR SUPERVISION OF OTHER NORMAL PREGNANCY IN THIRD TRIMESTER: Primary | ICD-10-CM

## 2023-01-10 LAB
APPEARANCE: CLEAR
BILIRUBIN: NEGATIVE
GLUCOSE (URINE DIPSTICK): NEGATIVE MG/DL
KETONES (URINE DIPSTICK): NEGATIVE MG/DL
LEUKOCYTES: NEGATIVE
MULTISTIX LOT#: 4023 NUMERIC
NITRITE, URINE: NEGATIVE
OCCULT BLOOD: NEGATIVE
PH, URINE: 7 (ref 4.5–8)
PROTEIN (URINE DIPSTICK): NEGATIVE MG/DL
SPECIFIC GRAVITY: 1.01 (ref 1–1.03)
URINE-COLOR: YELLOW
UROBILINOGEN,SEMI-QN: 0.2 MG/DL (ref 0–1.9)

## 2023-01-10 PROCEDURE — 3078F DIAST BP <80 MM HG: CPT | Performed by: OBSTETRICS & GYNECOLOGY

## 2023-01-10 PROCEDURE — 81003 URINALYSIS AUTO W/O SCOPE: CPT | Performed by: OBSTETRICS & GYNECOLOGY

## 2023-01-10 PROCEDURE — 3074F SYST BP LT 130 MM HG: CPT | Performed by: OBSTETRICS & GYNECOLOGY

## 2023-01-17 ENCOUNTER — ROUTINE PRENATAL (OUTPATIENT)
Dept: OBGYN CLINIC | Facility: CLINIC | Age: 28
End: 2023-01-17

## 2023-01-17 VITALS — BODY MASS INDEX: 27 KG/M2 | DIASTOLIC BLOOD PRESSURE: 65 MMHG | SYSTOLIC BLOOD PRESSURE: 111 MMHG | WEIGHT: 164.81 LBS

## 2023-01-17 DIAGNOSIS — Z34.83 ENCOUNTER FOR SUPERVISION OF OTHER NORMAL PREGNANCY IN THIRD TRIMESTER: Primary | ICD-10-CM

## 2023-01-17 PROBLEM — N92.6 IRREGULAR UTERINE BLEEDING: Status: RESOLVED | Noted: 2020-12-22 | Resolved: 2023-01-17

## 2023-01-17 LAB
APPEARANCE: CLEAR
BILIRUBIN: NEGATIVE
GLUCOSE (URINE DIPSTICK): NEGATIVE MG/DL
KETONES (URINE DIPSTICK): NEGATIVE MG/DL
MULTISTIX LOT#: 4023 NUMERIC
NITRITE, URINE: NEGATIVE
OCCULT BLOOD: NEGATIVE
PH, URINE: 6.5 (ref 4.5–8)
PROTEIN (URINE DIPSTICK): NEGATIVE MG/DL
SPECIFIC GRAVITY: 1.01 (ref 1–1.03)
URINE-COLOR: YELLOW
UROBILINOGEN,SEMI-QN: 0.2 MG/DL (ref 0–1.9)

## 2023-01-17 PROCEDURE — 3078F DIAST BP <80 MM HG: CPT | Performed by: OBSTETRICS & GYNECOLOGY

## 2023-01-17 PROCEDURE — 3074F SYST BP LT 130 MM HG: CPT | Performed by: OBSTETRICS & GYNECOLOGY

## 2023-01-17 PROCEDURE — 81003 URINALYSIS AUTO W/O SCOPE: CPT | Performed by: OBSTETRICS & GYNECOLOGY

## 2023-01-24 ENCOUNTER — ROUTINE PRENATAL (OUTPATIENT)
Dept: OBGYN CLINIC | Facility: CLINIC | Age: 28
End: 2023-01-24

## 2023-01-24 VITALS
SYSTOLIC BLOOD PRESSURE: 123 MMHG | WEIGHT: 166.81 LBS | HEART RATE: 89 BPM | DIASTOLIC BLOOD PRESSURE: 77 MMHG | BODY MASS INDEX: 28 KG/M2

## 2023-01-24 DIAGNOSIS — Z34.83 ENCOUNTER FOR SUPERVISION OF OTHER NORMAL PREGNANCY IN THIRD TRIMESTER: Primary | ICD-10-CM

## 2023-01-24 LAB
BILIRUBIN: NEGATIVE
GLUCOSE (URINE DIPSTICK): NEGATIVE MG/DL
KETONES (URINE DIPSTICK): NEGATIVE MG/DL
MULTISTIX LOT#: ABNORMAL NUMERIC
NITRITE, URINE: NEGATIVE
OCCULT BLOOD: NEGATIVE
PH, URINE: 7 (ref 4.5–8)
PROTEIN (URINE DIPSTICK): NEGATIVE MG/DL
SPECIFIC GRAVITY: 1.01 (ref 1–1.03)
URINE-COLOR: YELLOW
UROBILINOGEN,SEMI-QN: 0.2 MG/DL (ref 0–1.9)

## 2023-01-24 PROCEDURE — 3078F DIAST BP <80 MM HG: CPT | Performed by: OBSTETRICS & GYNECOLOGY

## 2023-01-24 PROCEDURE — 3074F SYST BP LT 130 MM HG: CPT | Performed by: OBSTETRICS & GYNECOLOGY

## 2023-01-24 PROCEDURE — 81002 URINALYSIS NONAUTO W/O SCOPE: CPT | Performed by: OBSTETRICS & GYNECOLOGY

## 2023-01-24 NOTE — PROGRESS NOTES
No C/Os. Labor Precautions reviewed. Postdates testing next visit if not delivered and to schedule for IOL next visit if not delivered.

## 2023-01-31 ENCOUNTER — HOSPITAL ENCOUNTER (INPATIENT)
Facility: HOSPITAL | Age: 28
LOS: 3 days | Discharge: HOME | End: 2023-02-03
Attending: OBSTETRICS & GYNECOLOGY | Admitting: OBSTETRICS & GYNECOLOGY
Payer: COMMERCIAL

## 2023-01-31 ENCOUNTER — HOSPITAL ENCOUNTER (OUTPATIENT)
Dept: PERINATAL CARE | Facility: HOSPITAL | Age: 28
Discharge: HOME | End: 2023-01-31
Attending: OBSTETRICS & GYNECOLOGY
Payer: COMMERCIAL

## 2023-01-31 ENCOUNTER — ROUTINE PRENATAL (OUTPATIENT)
Dept: OBGYN CLINIC | Facility: CLINIC | Age: 28
End: 2023-01-31

## 2023-01-31 ENCOUNTER — HOSPITAL ENCOUNTER (OUTPATIENT)
Facility: HOSPITAL | Age: 28
Discharge: LEFT BEFORE DISCHARGE | End: 2023-01-31
Attending: OBSTETRICS & GYNECOLOGY | Admitting: OBSTETRICS & GYNECOLOGY
Payer: COMMERCIAL

## 2023-01-31 VITALS — BODY MASS INDEX: 27 KG/M2 | DIASTOLIC BLOOD PRESSURE: 76 MMHG | SYSTOLIC BLOOD PRESSURE: 118 MMHG | WEIGHT: 165.19 LBS

## 2023-01-31 DIAGNOSIS — Z34.83 ENCOUNTER FOR SUPERVISION OF OTHER NORMAL PREGNANCY IN THIRD TRIMESTER: Primary | ICD-10-CM

## 2023-01-31 PROBLEM — Z37.9 NORMAL LABOR: Status: ACTIVE | Noted: 2023-01-31

## 2023-01-31 PROBLEM — Z34.90 PREGNANCY (HCC): Status: ACTIVE | Noted: 2023-01-31

## 2023-01-31 PROBLEM — Z34.90 PREGNANCY: Status: ACTIVE | Noted: 2023-01-31

## 2023-01-31 PROBLEM — Z37.9 NORMAL LABOR (HCC): Status: ACTIVE | Noted: 2023-01-31

## 2023-01-31 LAB
ANTIBODY SCREEN: NEGATIVE
BASOPHILS # BLD AUTO: 0.03 X10(3) UL (ref 0–0.2)
BASOPHILS NFR BLD AUTO: 0.3 %
BILIRUBIN: NEGATIVE
DEPRECATED RDW RBC AUTO: 45.6 FL (ref 35.1–46.3)
EOSINOPHIL # BLD AUTO: 0.12 X10(3) UL (ref 0–0.7)
EOSINOPHIL NFR BLD AUTO: 1.1 %
ERYTHROCYTE [DISTWIDTH] IN BLOOD BY AUTOMATED COUNT: 13.6 % (ref 11–15)
GLUCOSE (URINE DIPSTICK): NEGATIVE MG/DL
HCT VFR BLD AUTO: 39.7 %
HGB BLD-MCNC: 13.3 G/DL
IMM GRANULOCYTES # BLD AUTO: 0.13 X10(3) UL (ref 0–1)
IMM GRANULOCYTES NFR BLD: 1.2 %
KETONES (URINE DIPSTICK): NEGATIVE MG/DL
LYMPHOCYTES # BLD AUTO: 2.28 X10(3) UL (ref 1–4)
LYMPHOCYTES NFR BLD AUTO: 21.4 %
MCH RBC QN AUTO: 30.6 PG (ref 26–34)
MCHC RBC AUTO-ENTMCNC: 33.5 G/DL (ref 31–37)
MCV RBC AUTO: 91.3 FL
MONOCYTES # BLD AUTO: 0.84 X10(3) UL (ref 0.1–1)
MONOCYTES NFR BLD AUTO: 7.9 %
MULTISTIX LOT#: ABNORMAL NUMERIC
NEUTROPHILS # BLD AUTO: 7.25 X10 (3) UL (ref 1.5–7.7)
NEUTROPHILS # BLD AUTO: 7.25 X10(3) UL (ref 1.5–7.7)
NEUTROPHILS NFR BLD AUTO: 68.1 %
NITRITE, URINE: NEGATIVE
OCCULT BLOOD: NEGATIVE
PH, URINE: 6 (ref 4.5–8)
PLATELET # BLD AUTO: 239 10(3)UL (ref 150–450)
PROTEIN (URINE DIPSTICK): NEGATIVE MG/DL
RBC # BLD AUTO: 4.35 X10(6)UL
RH BLOOD TYPE: POSITIVE
SARS-COV-2 RNA RESP QL NAA+PROBE: NOT DETECTED
SPECIFIC GRAVITY: 1.01 (ref 1–1.03)
URINE-COLOR: YELLOW
UROBILINOGEN,SEMI-QN: 0.2 MG/DL (ref 0–1.9)
WBC # BLD AUTO: 10.7 X10(3) UL (ref 4–11)

## 2023-01-31 PROCEDURE — 86850 RBC ANTIBODY SCREEN: CPT | Performed by: OBSTETRICS & GYNECOLOGY

## 2023-01-31 PROCEDURE — 86901 BLOOD TYPING SEROLOGIC RH(D): CPT | Performed by: OBSTETRICS & GYNECOLOGY

## 2023-01-31 PROCEDURE — 99214 OFFICE O/P EST MOD 30 MIN: CPT

## 2023-01-31 PROCEDURE — 3074F SYST BP LT 130 MM HG: CPT | Performed by: OBSTETRICS & GYNECOLOGY

## 2023-01-31 PROCEDURE — 86900 BLOOD TYPING SEROLOGIC ABO: CPT | Performed by: OBSTETRICS & GYNECOLOGY

## 2023-01-31 PROCEDURE — 76815 OB US LIMITED FETUS(S): CPT | Performed by: OBSTETRICS & GYNECOLOGY

## 2023-01-31 PROCEDURE — 3078F DIAST BP <80 MM HG: CPT | Performed by: OBSTETRICS & GYNECOLOGY

## 2023-01-31 PROCEDURE — 85025 COMPLETE CBC W/AUTO DIFF WBC: CPT | Performed by: OBSTETRICS & GYNECOLOGY

## 2023-01-31 PROCEDURE — 81002 URINALYSIS NONAUTO W/O SCOPE: CPT | Performed by: OBSTETRICS & GYNECOLOGY

## 2023-01-31 RX ORDER — LIDOCAINE HYDROCHLORIDE 10 MG/ML
30 INJECTION, SOLUTION EPIDURAL; INFILTRATION; INTRACAUDAL; PERINEURAL ONCE
Status: DISCONTINUED | OUTPATIENT
Start: 2023-01-31 | End: 2023-02-01 | Stop reason: HOSPADM

## 2023-01-31 RX ORDER — ACETAMINOPHEN 650 MG/1
650 SUPPOSITORY RECTAL EVERY 6 HOURS PRN
Status: DISCONTINUED | OUTPATIENT
Start: 2023-01-31 | End: 2023-02-01 | Stop reason: HOSPADM

## 2023-01-31 RX ORDER — AMMONIA INHALANTS 0.04 G/.3ML
0.3 INHALANT RESPIRATORY (INHALATION) AS NEEDED
Status: DISCONTINUED | OUTPATIENT
Start: 2023-01-31 | End: 2023-02-01 | Stop reason: HOSPADM

## 2023-01-31 RX ORDER — TERBUTALINE SULFATE 1 MG/ML
0.25 INJECTION, SOLUTION SUBCUTANEOUS AS NEEDED
Status: DISCONTINUED | OUTPATIENT
Start: 2023-01-31 | End: 2023-02-01 | Stop reason: HOSPADM

## 2023-01-31 RX ORDER — ACETAMINOPHEN 500 MG
500 TABLET ORAL EVERY 6 HOURS PRN
Status: DISCONTINUED | OUTPATIENT
Start: 2023-01-31 | End: 2023-02-01 | Stop reason: HOSPADM

## 2023-01-31 RX ORDER — SODIUM CHLORIDE, SODIUM LACTATE, POTASSIUM CHLORIDE, CALCIUM CHLORIDE 600; 310; 30; 20 MG/100ML; MG/100ML; MG/100ML; MG/100ML
INJECTION, SOLUTION INTRAVENOUS CONTINUOUS
Status: DISCONTINUED | OUTPATIENT
Start: 2023-01-31 | End: 2023-02-03

## 2023-01-31 RX ORDER — IBUPROFEN 600 MG/1
600 TABLET ORAL EVERY 6 HOURS PRN
Status: DISCONTINUED | OUTPATIENT
Start: 2023-01-31 | End: 2023-02-01 | Stop reason: HOSPADM

## 2023-01-31 RX ORDER — TRISODIUM CITRATE DIHYDRATE AND CITRIC ACID MONOHYDRATE 500; 334 MG/5ML; MG/5ML
30 SOLUTION ORAL AS NEEDED
Status: DISCONTINUED | OUTPATIENT
Start: 2023-01-31 | End: 2023-02-01 | Stop reason: HOSPADM

## 2023-01-31 RX ORDER — ONDANSETRON 2 MG/ML
4 INJECTION INTRAMUSCULAR; INTRAVENOUS EVERY 6 HOURS PRN
Status: DISCONTINUED | OUTPATIENT
Start: 2023-01-31 | End: 2023-02-01 | Stop reason: HOSPADM

## 2023-01-31 NOTE — PROGRESS NOTES
[facility] ULTRASOUND REPORT   See imaging tab for complete consultation / ultrasound report      Ultrasound Findings:  Single IUP in cephalic presentation. Placenta is anterior. Cardiac activity is present at 147 bpm  MVP is 5.7 cm . BRENNEN 11.8 cm    Erin Kuhn MD      This was an ultrasound only encounter (no physician visit). The ultrasound was read by Dr. Neha Byrne         Note to patient and family  The [facility] makes medical notes available to patients in the interest of transparency. However, please be advised that this is a medical document. It is intended as [id number]-mx-euzr communication. It is written and medical language may contain abbreviations or verbiage that are technical and unfamiliar. It may appear blunt or direct. Medical documents are intended to carry relevant information, facts as evident, and the clinical opinion of the practitioner.

## 2023-01-31 NOTE — PROGRESS NOTES
NST/BRENNEN today for postdates testing. IOL Saturday at [address].  Labor Precautions reviewed.

## 2023-02-01 ENCOUNTER — ANESTHESIA EVENT (OUTPATIENT)
Dept: OBGYN UNIT | Facility: HOSPITAL | Age: 28
End: 2023-02-01
Payer: COMMERCIAL

## 2023-02-01 ENCOUNTER — ANESTHESIA (OUTPATIENT)
Dept: OBGYN UNIT | Facility: HOSPITAL | Age: 28
End: 2023-02-01
Payer: COMMERCIAL

## 2023-02-01 RX ORDER — BISACODYL 10 MG
10 SUPPOSITORY, RECTAL RECTAL ONCE AS NEEDED
Status: DISCONTINUED | OUTPATIENT
Start: 2023-02-01 | End: 2023-02-03

## 2023-02-01 RX ORDER — LIDOCAINE HYDROCHLORIDE AND EPINEPHRINE 15; 5 MG/ML; UG/ML
INJECTION, SOLUTION EPIDURAL
Status: COMPLETED | OUTPATIENT
Start: 2023-02-01 | End: 2023-02-01

## 2023-02-01 RX ORDER — DOCUSATE SODIUM 100 MG/1
100 CAPSULE, LIQUID FILLED ORAL 2 TIMES DAILY
Status: DISCONTINUED | OUTPATIENT
Start: 2023-02-01 | End: 2023-02-03

## 2023-02-01 RX ORDER — BUPIVACAINE HCL/0.9 % NACL/PF 0.25 %
5 PLASTIC BAG, INJECTION (ML) EPIDURAL AS NEEDED
Status: DISCONTINUED | OUTPATIENT
Start: 2023-02-01 | End: 2023-02-03

## 2023-02-01 RX ORDER — HYDROCODONE BITARTRATE AND ACETAMINOPHEN 5; 325 MG/1; MG/1
1 TABLET ORAL EVERY 6 HOURS PRN
Status: CANCELLED | OUTPATIENT
Start: 2023-02-01

## 2023-02-01 RX ORDER — BUPIVACAINE HCL/0.9 % NACL/PF 0.25 %
PLASTIC BAG, INJECTION (ML) EPIDURAL
Status: DISCONTINUED
Start: 2023-02-01 | End: 2023-02-01 | Stop reason: WASHOUT

## 2023-02-01 RX ORDER — ONDANSETRON 2 MG/ML
4 INJECTION INTRAMUSCULAR; INTRAVENOUS EVERY 6 HOURS PRN
Status: DISCONTINUED | OUTPATIENT
Start: 2023-02-01 | End: 2023-02-03

## 2023-02-01 RX ORDER — NALBUPHINE HCL 10 MG/ML
2.5 AMPUL (ML) INJECTION
Status: DISCONTINUED | OUTPATIENT
Start: 2023-02-01 | End: 2023-02-03

## 2023-02-01 RX ORDER — DIAPER,BRIEF,INFANT-TODD,DISP
1 EACH MISCELLANEOUS EVERY 6 HOURS PRN
Status: DISCONTINUED | OUTPATIENT
Start: 2023-02-01 | End: 2023-02-03

## 2023-02-01 RX ORDER — IBUPROFEN 600 MG/1
600 TABLET ORAL EVERY 6 HOURS
Status: DISCONTINUED | OUTPATIENT
Start: 2023-02-01 | End: 2023-02-03

## 2023-02-01 RX ORDER — BUPIVACAINE HYDROCHLORIDE 2.5 MG/ML
20 INJECTION, SOLUTION EPIDURAL; INFILTRATION; INTRACAUDAL ONCE
Status: DISCONTINUED | OUTPATIENT
Start: 2023-02-01 | End: 2023-02-01 | Stop reason: HOSPADM

## 2023-02-01 RX ORDER — ACETAMINOPHEN 500 MG
500 TABLET ORAL EVERY 6 HOURS PRN
Status: DISCONTINUED | OUTPATIENT
Start: 2023-02-01 | End: 2023-02-03

## 2023-02-01 RX ORDER — LIDOCAINE HYDROCHLORIDE 10 MG/ML
INJECTION, SOLUTION INFILTRATION; PERINEURAL
Status: COMPLETED | OUTPATIENT
Start: 2023-02-01 | End: 2023-02-01

## 2023-02-01 RX ORDER — BUPIVACAINE HYDROCHLORIDE 2.5 MG/ML
INJECTION, SOLUTION EPIDURAL; INFILTRATION; INTRACAUDAL
Status: DISCONTINUED
Start: 2023-02-01 | End: 2023-02-01 | Stop reason: WASHOUT

## 2023-02-01 RX ORDER — ACETAMINOPHEN 500 MG
1000 TABLET ORAL EVERY 6 HOURS PRN
Status: DISCONTINUED | OUTPATIENT
Start: 2023-02-01 | End: 2023-02-03

## 2023-02-01 RX ORDER — AMMONIA INHALANTS 0.04 G/.3ML
0.3 INHALANT RESPIRATORY (INHALATION) AS NEEDED
Status: DISCONTINUED | OUTPATIENT
Start: 2023-02-01 | End: 2023-02-03

## 2023-02-01 RX ORDER — SIMETHICONE 80 MG
80 TABLET,CHEWABLE ORAL 3 TIMES DAILY PRN
Status: DISCONTINUED | OUTPATIENT
Start: 2023-02-01 | End: 2023-02-03

## 2023-02-01 RX ADMIN — LIDOCAINE HYDROCHLORIDE 5 ML: 10 INJECTION, SOLUTION INFILTRATION; PERINEURAL at 01:04:00

## 2023-02-01 RX ADMIN — LIDOCAINE HYDROCHLORIDE AND EPINEPHRINE 5 ML: 15; 5 INJECTION, SOLUTION EPIDURAL at 01:04:00

## 2023-02-01 NOTE — ANESTHESIA POSTPROCEDURE EVALUATION
Patient: Raisa Valencia    Procedure Summary     Date: 02/01/23 Room / Location:     Anesthesia Start: 0104 Anesthesia Stop: 0522    Procedure: LABOR ANALGESIA Diagnosis:     Scheduled Providers:  Anesthesiologist: Johnna Kuhn MD    Anesthesia Type: epidural ASA Status: 2          Anesthesia Type: epidural    Vitals Value Taken Time   BP  02/01/23 0732   Temp  02/01/23 0732   Pulse 87 02/01/23 0729   Resp  02/01/23 0732   SpO2 90 % 02/01/23 0729   Vitals shown include unvalidated device data.     [facility] Evaluation:   Patient Evaluated in floor  Patient Participation: complete - patient participated  Pain Score: 0  Pain Management: adequate  Airway Patency:patent  Dental exam unchanged from preop  Yes    Cardiovascular Status: hemodynamically stable  Respiratory Status: room air  Postoperative Hydration stable  Comments: No HA no BA no new neurologic signs or symptoms   Site is clean dry intact       CIERRA LAO MD  2/1/2023 7:32 AM

## 2023-02-01 NOTE — OPERATIVE REPORT
[facility]    Vaginal Delivery Note    Lorraine Silvestre Patient Status:  Inpatient    1995 MRN [id number]   Location [address]  Attending Coco Jacobsen MD   Hosp Day # 1 PCP None Pcp     Delivery     Infant  Date of Delivery: 2023   Time of Delivery: 5:18 AM  Delivery Type: Vaginal, Vacuum (Extractor)    Infant Sex/Birthweight: female No birth weight on file. Presentation Vertex [1]  Position Left [1] Occiput [1] Posterior [3]    Apgars:  1 minute:                 5 minutes:                          10 minutes:      Placenta  Date/Time of Delivery: 2023  5:22 AM   Delivery: spontaneous  Placenta to Pathology: no  Cord Gases Submitted: yes  Cord Blood Collection: yes  Cord Tissue Collection: no  Cord Complications: none  Sponge and Needle Counts:  Verified yes    Maternal Anesthesia: epidural   Episiotomy/Laceration Repair  Repair Comments: 2 deg rep w/ 20 and 30 vicryl    Delivery Complications  Vacuum Assistance: indication for: non-reassuring fetal status. Verbal consent obtained. Station of fetal vertex is low (+2 to +3). Bladder emptied: yes. Type of vacuum used was a [employer]. Maximum Pressure in the green zone. Number of pulls 2. Number of pop offs none. Minutes applied to fetal scalp 1. Pressure applied only with contration yes.  scalp injuries: none. Additional comments: 2 deg episiotomy made, then with second pull vacuum delivery successful. no extension. 2 degree repair done.  .    Neonatologist Present: yes  Delivery Comment: lop    Intake/Output   EBL:  See chart  ml    Larry Alcantara MD   2023  5:50 AM

## 2023-02-01 NOTE — ANESTHESIA PROCEDURE NOTES
Labor Analgesia    Date/Time: 2/1/2023 1:04 AM  Performed by: Josey Lugo MD  Authorized by: Josey Lugo MD       General Information and Staff    Start Time:  2/1/2023 1:04 AM  End Time:  2/1/2023 1:27 AM  Anesthesiologist:  Josey Lugo MD  Performed by:   Anesthesiologist  Patient Location:  OB  Site Identification: surface landmarks    Reason for Block: labor epidural    Preanesthetic Checklist: patient identified, IV checked, site marked, risks and benefits discussed, monitors and equipment checked, pre-op evaluation, timeout performed, IV bolus, anesthesia consent and sterile technique used      Procedure Details    Patient Position:  Sitting  Prep: ChloraPrep and patient draped    Monitoring:  Heart rate, cardiac monitor and continuous pulse ox  Approach:  Midline    Epidural Needle    Injection Technique:  RAFITA saline  Needle Type:  Tuohy  Needle Gauge:  18 G  Needle Length:  3.5 in  Needle Insertion Depth:  5  Location:  L2-3    Spinal Needle      Catheter    Catheter Type:  Multi-orifice  Catheter Size:  20 G  Catheter at Skin Depth:  11  Test Dose:  Negative    Assessment    Sensory Level:  T8    Additional Comments

## 2023-02-01 NOTE — PROGRESS NOTES
Pt is a 28year old female admitted to [address] [place] [address]. Patient presents with:  Non-stress Test: Post dates     Pt is  40w3d intra-uterine pregnancy. History obtained, consents signed. Oriented to room, staff, and plan of care.

## 2023-02-01 NOTE — PROGRESS NOTES
[facility]    Labor Progress Note    Narayan Schmidt Patient Status:  Outpatient    1995 MRN [id number]   Location [address] Attending Mandie Messer MD   Hosp Day # 0 PCP None Pcp        Subjective   Interval History: For nst /howard post edc        Objective   Vital signs in last 24 hours:  Pulse:  [94] 94  BP: (118-124)/(76-79) 124/79    Input/Output:  No intake or output data in the 24 hours ending 23 1843    Fetal Surveillance:  Fetal heart tones: 130 cat 1   Uterine contractions: 2 to 4     Sterile vaginal exam:  Dilation: closed   Effacement: 50%   Station: -2   Position: Cephalic  Soft/posterior      Results:     Lab Results   Component Value Date    TREPONEMALAB Negative 2022    ABO O 2022    RH Positive 2022    WBC 8.8 2022    HGB 12.4 2022    HCT 37.8 2022    .0 2022    CREATSERUM 0.69 2022    BUN 8 2022     2022    K 3.3 (L) 2022     2022    CO2 25.0 2022    GLU 98 2022    CA 8.9 2022    ALB 4.1 2022    ALKPHO 61 2022    BILT 0.3 2022    TP 7.6 2022    AST 16 2022    ALT 19 2022       Lab Results   Component Value Date    COLORUR Yellow 2022    CLARITY Cloudy (A) 2022    SPECGRAVITY 1.010 2023    PROUR Negative 2022    GLUUR Negative 2022    KETUR 20 (A) 2022    BILUR Negative 2022    BLOODURINE Negative 2022    NITRITE Negative 2023    UROBILINOGEN <2.0 2022    LEUUR Large (A) 2022    UASA 40 (A) 2022           Assessment/Plan     * No active hospital problems. *            Plan discussed with patient who verbalizes understanding and agreement.         Alphonse Loza MD  2023

## 2023-02-02 LAB
BASOPHILS # BLD AUTO: 0.03 X10(3) UL (ref 0–0.2)
BASOPHILS NFR BLD AUTO: 0.3 %
DEPRECATED RDW RBC AUTO: 46.2 FL (ref 35.1–46.3)
EOSINOPHIL # BLD AUTO: 0.22 X10(3) UL (ref 0–0.7)
EOSINOPHIL NFR BLD AUTO: 2 %
ERYTHROCYTE [DISTWIDTH] IN BLOOD BY AUTOMATED COUNT: 13.8 % (ref 11–15)
HCT VFR BLD AUTO: 32.2 %
HGB BLD-MCNC: 11 G/DL
IMM GRANULOCYTES # BLD AUTO: 0.1 X10(3) UL (ref 0–1)
IMM GRANULOCYTES NFR BLD: 0.9 %
LYMPHOCYTES # BLD AUTO: 2.98 X10(3) UL (ref 1–4)
LYMPHOCYTES NFR BLD AUTO: 26.8 %
MCH RBC QN AUTO: 31.6 PG (ref 26–34)
MCHC RBC AUTO-ENTMCNC: 34.2 G/DL (ref 31–37)
MCV RBC AUTO: 92.5 FL
MONOCYTES # BLD AUTO: 0.86 X10(3) UL (ref 0.1–1)
MONOCYTES NFR BLD AUTO: 7.7 %
NEUTROPHILS # BLD AUTO: 6.93 X10 (3) UL (ref 1.5–7.7)
NEUTROPHILS # BLD AUTO: 6.93 X10(3) UL (ref 1.5–7.7)
NEUTROPHILS NFR BLD AUTO: 62.3 %
PLATELET # BLD AUTO: 218 10(3)UL (ref 150–450)
RBC # BLD AUTO: 3.48 X10(6)UL
WBC # BLD AUTO: 11.1 X10(3) UL (ref 4–11)

## 2023-02-02 PROCEDURE — 85025 COMPLETE CBC W/AUTO DIFF WBC: CPT | Performed by: OBSTETRICS & GYNECOLOGY

## 2023-02-02 NOTE — LACTATION NOTE
LACTATION NOTE - MOTHER      Evaluation Type: Inpatient    Problems identified  Problems identified: Knowledge deficit; Unable to acheive sustained latch  Problems Identified Other: latch difficulty per rn    Maternal history  Maternal history: Instrumental delivery  Other/comment: vacuum assisted delivery    Breastfeeding goal  Breastfeeding goal: To maintain breast milk feeding per patient goal    Maternal Assessment  Bilateral Breasts: Soft;Symmetrical  Bilateral Nipples: Thick/dense;Colostrum difficult to express  Prior breastfeeding experience (comment below): Primip  Breastfeeding Assistance: Breastfeeding assistance provided with permission    Pain assessment  Pain, additional: Pain w/initial sucks only  Treatment of Sore Nipples: Expressed breast milk;Deeper latch techniques    Guidelines for use of: Other (comment): Mom attempting to latch infant in cross cradle hold but infant latching shallow and having a hard time sustaining latch so assisted into laid back position and infant sustained on right with swallows heard. Educated on HE and encouraged mom to HE if infant not latching well. Encouraged skin to skin and parent led feeds q 2-3 hours and to call for support prn.

## 2023-02-02 NOTE — LACTATION NOTE
This note was copied from a baby's chart. LACTATION NOTE - INFANT    Evaluation Type  Evaluation Type: Inpatient    Problems & Assessment  Problems Diagnosed or Identified:  feeding problem; Shallow latch;Latch difficulty  Infant Assessment: Anterior fontanel soft and flat;Hunger cues present  Muscle tone: Appropriate for GA    Feeding Assessment  Summary Current Feeding: Breastfeeding with formula supplement  Last 24 hour feeding summary: formula x 1  Breastfeeding Assessment: Assisted with breastfeeding w/mother's permission; Tolerated feeding well  Breastfeeding lasted # of minutes: 10  Breastfeeding Positions: right breast;laid back  Latch: Repeated attempts, hold nipple in mouth, stimulate to suck  Audible Sucks/Swallows:  A few with stimulation  Type of Nipple: Everted (after stimulation)  Comfort (Breast/Nipple): Filling, red/small blisters/bruises, mild/mod discomfort  Hold (Positioning): Full assist, teach one side, mother does other, staff holds  [facility] Score: 6

## 2023-02-02 NOTE — LACTATION NOTE
This note was copied from a baby's chart. LACTATION NOTE - INFANT    Evaluation Type  Evaluation Type: Inpatient    Problems & Assessment  Problems Diagnosed or Identified: Shallow latch  Infant Assessment: Hunger cues present    Feeding Assessment  Summary Current Feeding: Adlib;Breastfeeding exclusively  Breastfeeding Assessment: Assisted with breastfeeding w/mother's permission;Deep latch achieved and observed; Coordinated suck/swallow; Tolerated feeding well  Breastfeeding Positions: cross cradle;football;right breast;left breast  Latch: Repeated attempts, hold nipple in mouth, stimulate to suck  Audible Sucks/Swallows: Spontaneous and intermittent (24 hours old)  Type of Nipple: Everted (after stimulation)  Comfort (Breast/Nipple): Filling, red/small blisters/bruises, mild/mod discomfort  Hold (Positioning): Full assist, teach one side, mother does other, staff holds  [facility] Score: 7  Other (comment): Infant fussy initially with frequent popping off breast, repositioned in football hold and achieved deep latch. Educated on tugging vs. pinching and nutritive sucking.

## 2023-02-02 NOTE — LACTATION NOTE
LACTATION NOTE - MOTHER      Evaluation Type: Inpatient    Problems identified  Problems identified: Knowledge deficit; Nipple pain         Breastfeeding goal  Breastfeeding goal: To maintain breast milk feeding per patient goal    Maternal Assessment  Prior breastfeeding experience (comment below): Primip  Breastfeeding Assistance: Breastfeeding assistance provided with permission    Pain assessment  Pain, additional: Pain location  Pain Location: Nipples  Treatment of Sore Nipples: Deeper latch techniques    Guidelines for use of: Other (comment): Mom reports nipple discomfort and has trouble obtaining deep latch when nursing infant. Instructed to call [facility] at next feeding for latch assessment.

## 2023-02-02 NOTE — PLAN OF CARE
Problem: POSTPARTUM  Goal: Long Term Goal:Experiences normal postpartum course  Description: INTERVENTIONS:  - Assess and monitor vital signs and lab values. - Assess fundus and lochia. - Provide ice/sitz baths for perineum discomfort. - Monitor healing of incision/episiotomy/laceration, and assess for signs and symptoms of infection and hematoma. - Assess bladder function and monitor for bladder distention.  - Provide/instruct/assist with pericare as needed. - Provide VTE prophylaxis as needed. - Monitor bowel function.  - Encourage ambulation and provide assistance as needed. - Assess and monitor emotional status and provide social service/psych resources as needed. - Utilize standard precautions and use personal protective equipment as indicated. Ensure aseptic care of all intravenous lines and invasive tubes/drains.  - Obtain immunization and exposure to communicable diseases history. 2/2/2023 9665 by Tony Rogers RN  Outcome: Progressing  2/2/2023 9306 by Tony Rogers RN  Outcome: Progressing  2/2/2023 7931 by Tony Rogers RN  Outcome: Progressing  Goal: Optimize infant feeding at the breast  Description: INTERVENTIONS:  - Initiate breast feeding within first hour after birth. - Monitor effectiveness of current breast feeding efforts. - Assess support systems available to mother/family.  - Identify cultural beliefs/practices regarding lactation, letdown techniques, maternal food preferences. - Assess mother's knowledge and previous experience with breast feeding.  - Provide information as needed about early infant feeding cues (e.g., rooting, lip smacking, sucking fingers/hand) versus late cue of crying.  - Discuss/demonstrate breast feeding aids (e.g., infant sling, nursing footstool/pillows, and breast pumps). - Encourage mother/other family members to express feelings/concerns, and actively listen.   - Educate father/SO about benefits of breast feeding and how to manage common lactation challenges. - Recommend avoidance of specific medications or substances incompatible with breast feeding.  - Assess and monitor for signs of nipple pain/trauma. - Instruct and provide assistance with proper latch. - Review techniques for milk expression (breast pumping) and storage of breast milk. Provide pumping equipment/supplies, instructions and assistance, as needed. - Encourage rooming-in and breast feeding on demand.  - Encourage skin-to-skin contact. - Provide LC support as needed. - Assess for and manage engorgement. - Provide breast feeding education handouts and information on community breast feeding support. 2023 6645 by Katia Coto RN  Outcome: Progressing  2023 6753 by Katia Coto RN  Outcome: Progressing  2023 3279 by Katia Coto RN  Outcome: Progressing  Goal: Establishment of adequate milk supply with medication/procedure interruptions  Description: INTERVENTIONS:  - Review techniques for milk expression (breast pumping). - Provide pumping equipment/supplies, instructions, and assistance until it is safe to breastfeed infant. 2023 6121 by Katia Coto RN  Outcome: Progressing  2023 7358 by Katia Coto RN  Outcome: Progressing  2023 6403 by Katia Coto RN  Outcome: Progressing  Goal: Appropriate maternal -  bonding  Description: INTERVENTIONS:  - Assess caregiver- interactions. - Assess caregiver's emotional status and coping mechanisms. - Encourage caregiver to participate in  daily care. - Assess support systems available to mother/family.  - Provide /case management support as needed.   2023 4443 by Katia Coto RN  Outcome: Progressing  2023 6987 by Katia Coto RN  Outcome: Progressing  2023 9774 by Katia Coto RN  Outcome: Progressing

## 2023-02-02 NOTE — PLAN OF CARE
Problem: POSTPARTUM  Goal: Long Term Goal:Experiences normal postpartum course  Description: INTERVENTIONS:  - Assess and monitor vital signs and lab values. - Assess fundus and lochia. - Provide ice/sitz baths for perineum discomfort. - Monitor healing of incision/episiotomy/laceration, and assess for signs and symptoms of infection and hematoma. - Assess bladder function and monitor for bladder distention.  - Provide/instruct/assist with pericare as needed. - Provide VTE prophylaxis as needed. - Monitor bowel function.  - Encourage ambulation and provide assistance as needed. - Assess and monitor emotional status and provide social service/psych resources as needed. - Utilize standard precautions and use personal protective equipment as indicated. Ensure aseptic care of all intravenous lines and invasive tubes/drains.  - Obtain immunization and exposure to communicable diseases history. 2/2/2023 8354 by Ayesha Agarwal RN  Outcome: Progressing  2/2/2023 1711 by Ayesha Agarwal RN  Outcome: Progressing  Goal: Optimize infant feeding at the breast  Description: INTERVENTIONS:  - Initiate breast feeding within first hour after birth. - Monitor effectiveness of current breast feeding efforts. - Assess support systems available to mother/family.  - Identify cultural beliefs/practices regarding lactation, letdown techniques, maternal food preferences. - Assess mother's knowledge and previous experience with breast feeding.  - Provide information as needed about early infant feeding cues (e.g., rooting, lip smacking, sucking fingers/hand) versus late cue of crying.  - Discuss/demonstrate breast feeding aids (e.g., infant sling, nursing footstool/pillows, and breast pumps). - Encourage mother/other family members to express feelings/concerns, and actively listen. - Educate father/SO about benefits of breast feeding and how to manage common lactation challenges.   - Recommend avoidance of specific medications or substances incompatible with breast feeding.  - Assess and monitor for signs of nipple pain/trauma. - Instruct and provide assistance with proper latch. - Review techniques for milk expression (breast pumping) and storage of breast milk. Provide pumping equipment/supplies, instructions and assistance, as needed. - Encourage rooming-in and breast feeding on demand.  - Encourage skin-to-skin contact. - Provide LC support as needed. - Assess for and manage engorgement. - Provide breast feeding education handouts and information on community breast feeding support. 2023 9744 by Perla Dutton RN  Outcome: Progressing  2023 0287 by Perla Dutton RN  Outcome: Progressing  Goal: Establishment of adequate milk supply with medication/procedure interruptions  Description: INTERVENTIONS:  - Review techniques for milk expression (breast pumping). - Provide pumping equipment/supplies, instructions, and assistance until it is safe to breastfeed infant. 2023 6348 by Perla Dutton RN  Outcome: Progressing  2023 9619 by Perla Dutton RN  Outcome: Progressing  Goal: Appropriate maternal -  bonding  Description: INTERVENTIONS:  - Assess caregiver- interactions. - Assess caregiver's emotional status and coping mechanisms. - Encourage caregiver to participate in  daily care. - Assess support systems available to mother/family.  - Provide /case management support as needed.   2023 2422 by Perla Dutton RN  Outcome: Progressing  2023 9460 by Perla Dutton RN  Outcome: Progressing

## 2023-02-03 VITALS
DIASTOLIC BLOOD PRESSURE: 70 MMHG | OXYGEN SATURATION: 92 % | RESPIRATION RATE: 16 BRPM | HEART RATE: 80 BPM | SYSTOLIC BLOOD PRESSURE: 118 MMHG | TEMPERATURE: 98 F

## 2023-02-03 RX ORDER — IRON, FOLIC ACID, CYANOCOBALAMIN, ASCORBIC ACID, AND DOCUSATE SODIUM 90; 1; 12; 120; 50 MG/1; MG/1; UG/1; MG/1; MG/1
1 TABLET, FILM COATED ORAL EVERY OTHER DAY
Qty: 45 TABLET | Refills: 3 | Status: SHIPPED | OUTPATIENT
Start: 2023-02-03 | End: 2023-02-06

## 2023-02-03 RX ORDER — IBUPROFEN 600 MG/1
600 TABLET ORAL EVERY 6 HOURS PRN
Qty: 30 TABLET | Refills: 0 | Status: SHIPPED | OUTPATIENT
Start: 2023-02-03

## 2023-02-03 NOTE — LACTATION NOTE
LACTATION NOTE - MOTHER      Evaluation Type: Inpatient    Problems identified  Problems identified: Knowledge deficit         Breastfeeding goal  Breastfeeding goal: To maintain breast milk feeding per patient goal    Maternal Assessment  Bilateral Breasts: Soft;Symmetrical  Bilateral Nipples: Healing well  Prior breastfeeding experience (comment below): Primip  Breastfeeding Assistance: Breastfeeding assistance provided with permission    Pain assessment  Pain, additional: Pain location  Pain Location: Nipples  Treatment of Sore Nipples: Deeper latch techniques    Guidelines for use of:  Suggested use of pump: For comfort as needed;Pump if infant is not latching to breast  Other (comment): Mom independently BF, reports infant doing well and able to achieve deep latch now. Provided discharge education and encouraged to call [facility] services for additional support.

## 2023-02-03 NOTE — DISCHARGE SUMMARY
[place] [facility]    Discharge Summary    Charisma Woo Patient Status:  Inpatient    1995 MRN [id number]   Location [facility] 3SE Attending Aide Chappell MD   Hosp Day # 3 PCP None Pcp     Date of Admission: 2023   Date of Discharge: No discharge date for patient encounter. Admitting Diagnosis: pregnancy  Normal labor  Pregnancy    Disposition: Home    Discharge Diagnosis: . Active Problems:    Normal labor    Pregnancy      Hospital Course:   Reason for Admission: Labor    Discharge Physical Exam: General appearance:  alert, appears stated age and cooperative  Pulmonary: clear to auscultation bilaterally  Breasts:  normal appearance, no masses or tenderness  Cardiovascular: S1, S2 normal, no murmur, click, rub or gallop, regular rate and rhythm  Abdominal: soft, non-tender; bowel sounds normal; no masses,  no organomegaly  Extremities: extremities normal, atraumatic, no cyanosis or edema    Hospital Course: S/P . Unremarkable postpartum course. Complications: None            Discharge Plan:   Discharge Condition: Stable    Current Discharge Medication List    New Orders    ibuprofen 600 MG Oral Tab  Take 1 tablet (600 mg total) by mouth every 6 (six) hours as needed for Pain. Home Meds - Modified    Fe Cbn-Fe Gluc-FA-B12-C-DSS (FERRALET 90) 90-1 MG Oral Tab  Take 1 tablet by mouth every other day. Home Meds - Unchanged    prenatal vitamin with DHA 27-0.8-228 MG Oral Cap  Take 1 capsule by mouth daily. Misc. Devices (BREAST PUMP) Does not apply Misc  DOUBLE ELECTRIC BREAST PUMP EQUIVALENT TO MEDELA PUMP IN STYLE              Discharge Diet: General diet    Discharge Activity: As tolerated    Follow up: Follow-up Information     Monty Gomez MD. Schedule an appointment as soon as possible for a visit in 3 week(s).     Specialty: OBSTETRICS & GYNECOLOGY  Contact information:  [address] [address]  [phone] Christiano Perez MD  2/3/2023

## 2023-02-03 NOTE — LACTATION NOTE
This note was copied from a baby's chart. LACTATION NOTE - INFANT    Evaluation Type  Evaluation Type: Inpatient    Problems & Assessment  Problems Diagnosed or Identified: Shallow latch  Infant Assessment: Hunger cues present    Feeding Assessment  Summary Current Feeding: Adlib;Breastfeeding exclusively  Breastfeeding Assessment: Assisted with breastfeeding w/mother's permission;Coordinated suck/swallow; Tolerated feeding well  Breastfeeding Positions: cradle;left breast  Latch: Grasps breast, tongue down, lips flanged, rhythmic sucking  Audible Sucks/Swallows: Spontaneous and intermittent (24 hours old)  Type of Nipple: Everted (after stimulation)  Comfort (Breast/Nipple): Filling, red/small blisters/bruises, mild/mod discomfort  Hold (Positioning): No assist from staff, mother able to position/hold infant  LATCH Score: 9  Other (comment): Infant fussy initially with frequent popping off breast, repositioned in football hold and achieved deep latch. Educated on tugging vs. pinching and nutritive sucking.

## 2023-02-06 ENCOUNTER — TELEPHONE (OUTPATIENT)
Dept: OBGYN CLINIC | Facility: CLINIC | Age: 28
End: 2023-02-06

## 2023-02-06 RX ORDER — IRON, FOLIC ACID, CYANOCOBALAMIN, ASCORBIC ACID, AND DOCUSATE SODIUM 90; 1; 12; 120; 50 MG/1; MG/1; UG/1; MG/1; MG/1
1 TABLET, FILM COATED ORAL EVERY OTHER DAY
Qty: 45 TABLET | Refills: 3 | Status: SHIPPED | OUTPATIENT
Start: 2023-02-06 | End: 2023-03-08

## 2023-02-06 NOTE — TELEPHONE ENCOUNTER
Patient dropped off fmla and std forms. Payment NOT collected.  CECILIA signed     Jamaal Batista [phone]

## 2023-02-06 NOTE — TELEPHONE ENCOUNTER
Fe Cbn-Fe Gluc-FA-B12-C-DSS (FERRALET 90) 90-1 MG Oral Tab, Take 1 tablet by mouth every other day., Disp: 45 tablet, Rfl: 3      Message:   Plan does not cover the medication. Please call plan at [phone] to initiate a prior authorization or call/fax pharmacy to change medication.  Patient ID is [id number]

## 2023-02-08 NOTE — TELEPHONE ENCOUNTER
Dr. Gurinder Macario,      Please sign off on form: Disab   -Highlight the patient and hit \"Chart\" button. -In Chart Review, w/in the Encounter tab - click 1 time on the Telephone call encounter for 2/6/23 Scroll down the telephone encounter.  -Click \"scan on\" blue Hyperlink under \"Media\" heading for Disab Dr. Gurinder Macario 2/8/23 w/in the telephone enc.  -Click on Acknowledge button at the bottom right corner and left-click onto image, signature stamp appears and drag signature to Provider signature line. Stamp will turn blue. Close window.      Thank you,  [facility]

## 2023-02-10 NOTE — TELEPHONE ENCOUNTER
Forms completed and faxed to [facility] at [phone] confirmation rcv'd. [employer] message sent to pt.

## 2023-02-13 ENCOUNTER — TELEPHONE (OUTPATIENT)
Dept: OBGYN CLINIC | Facility: CLINIC | Age: 28
End: 2023-02-13

## 2023-02-13 NOTE — TELEPHONE ENCOUNTER
Pt Name and  verified. Pt states that she is possibly experiencing some symptoms of yeast. Pt started having symptoms on Saturday. Pt advised to come in as she is also suffering from possible hemorrhoids but is unsure. Pt scheduled with [facility] tomorrow in [place]. Pt informed that baby breastfeeding will not develop yeast. Pt advised to just make sure that she is cleaning breast before and after each feed.

## 2023-02-13 NOTE — TELEPHONE ENCOUNTER
Patient delivered on 2/1. On Saturday she started having symptoms of a yeast infection. She is breast feeding and is wondering if an infection of this nature may affect the baby. Please advise.

## 2023-02-14 ENCOUNTER — OFFICE VISIT (OUTPATIENT)
Dept: OBGYN CLINIC | Facility: CLINIC | Age: 28
End: 2023-02-14

## 2023-02-14 VITALS — SYSTOLIC BLOOD PRESSURE: 116 MMHG | DIASTOLIC BLOOD PRESSURE: 74 MMHG | BODY MASS INDEX: 25 KG/M2 | WEIGHT: 149.38 LBS

## 2023-02-14 DIAGNOSIS — N89.8 VAGINAL ITCHING: Primary | ICD-10-CM

## 2023-02-14 PROBLEM — T83.32XA IUD THREADS LOST: Status: ACTIVE | Noted: 2023-02-14

## 2023-02-14 PROCEDURE — 87205 SMEAR GRAM STAIN: CPT | Performed by: NURSE PRACTITIONER

## 2023-02-14 PROCEDURE — 87106 FUNGI IDENTIFICATION YEAST: CPT | Performed by: NURSE PRACTITIONER

## 2023-02-14 PROCEDURE — 87808 TRICHOMONAS ASSAY W/OPTIC: CPT | Performed by: NURSE PRACTITIONER

## 2023-02-14 RX ORDER — CLOTRIMAZOLE AND BETAMETHASONE DIPROPIONATE 10; .64 MG/G; MG/G
1 CREAM TOPICAL 2 TIMES DAILY
Qty: 1 EACH | Refills: 0 | OUTPATIENT
Start: 2023-02-14 | End: 2023-02-21

## 2023-02-17 LAB — TRICHOMONAS SCREEN: NEGATIVE

## 2023-02-22 ENCOUNTER — TELEPHONE (OUTPATIENT)
Dept: OBGYN UNIT | Facility: HOSPITAL | Age: 28
End: 2023-02-22

## 2023-02-27 ENCOUNTER — POSTPARTUM (OUTPATIENT)
Dept: OBGYN CLINIC | Facility: CLINIC | Age: 28
End: 2023-02-27

## 2023-02-27 VITALS
DIASTOLIC BLOOD PRESSURE: 71 MMHG | WEIGHT: 146.19 LBS | HEIGHT: 64 IN | BODY MASS INDEX: 24.96 KG/M2 | SYSTOLIC BLOOD PRESSURE: 107 MMHG

## 2023-02-27 PROCEDURE — 3074F SYST BP LT 130 MM HG: CPT | Performed by: OBSTETRICS & GYNECOLOGY

## 2023-02-27 PROCEDURE — 3078F DIAST BP <80 MM HG: CPT | Performed by: OBSTETRICS & GYNECOLOGY

## 2023-02-27 PROCEDURE — 3008F BODY MASS INDEX DOCD: CPT | Performed by: OBSTETRICS & GYNECOLOGY

## 2023-02-27 RX ORDER — FERROUS SULFATE 300 MG/5ML
300 LIQUID (ML) ORAL DAILY
COMMUNITY

## 2023-07-05 ENCOUNTER — OFFICE VISIT (OUTPATIENT)
Dept: INTERNAL MEDICINE CLINIC | Facility: CLINIC | Age: 28
End: 2023-07-05

## 2023-07-05 VITALS
SYSTOLIC BLOOD PRESSURE: 112 MMHG | BODY MASS INDEX: 24.24 KG/M2 | WEIGHT: 142 LBS | DIASTOLIC BLOOD PRESSURE: 54 MMHG | OXYGEN SATURATION: 97 % | HEIGHT: 64 IN | HEART RATE: 81 BPM

## 2023-07-05 DIAGNOSIS — J02.9 SORE THROAT: ICD-10-CM

## 2023-07-05 DIAGNOSIS — R09.82 POST-NASAL DRIP: Primary | ICD-10-CM

## 2023-07-05 PROCEDURE — 3078F DIAST BP <80 MM HG: CPT

## 2023-07-05 PROCEDURE — 3008F BODY MASS INDEX DOCD: CPT

## 2023-07-05 PROCEDURE — 3074F SYST BP LT 130 MM HG: CPT

## 2023-07-05 PROCEDURE — 99214 OFFICE O/P EST MOD 30 MIN: CPT

## 2023-07-05 RX ORDER — FLUTICASONE PROPIONATE 50 MCG
2 SPRAY, SUSPENSION (ML) NASAL DAILY
Qty: 15.8 ML | Refills: 0 | Status: SHIPPED | OUTPATIENT
Start: 2023-07-05 | End: 2024-06-29

## 2023-07-05 RX ORDER — GLYCERIN 0.25 %
1 DROPS OPHTHALMIC (EYE) 2 TIMES DAILY
Qty: 30 ML | Refills: 0 | Status: SHIPPED | OUTPATIENT
Start: 2023-07-05

## 2023-07-25 ENCOUNTER — OFFICE VISIT (OUTPATIENT)
Dept: OBGYN CLINIC | Facility: CLINIC | Age: 28
End: 2023-07-25

## 2023-07-25 ENCOUNTER — TELEPHONE (OUTPATIENT)
Dept: OBGYN CLINIC | Facility: CLINIC | Age: 28
End: 2023-07-25

## 2023-07-25 VITALS
HEIGHT: 64 IN | WEIGHT: 143 LBS | DIASTOLIC BLOOD PRESSURE: 70 MMHG | HEART RATE: 84 BPM | BODY MASS INDEX: 24.41 KG/M2 | SYSTOLIC BLOOD PRESSURE: 101 MMHG

## 2023-07-25 DIAGNOSIS — N61.0 MASTITIS: Primary | ICD-10-CM

## 2023-07-25 PROCEDURE — 3074F SYST BP LT 130 MM HG: CPT | Performed by: STUDENT IN AN ORGANIZED HEALTH CARE EDUCATION/TRAINING PROGRAM

## 2023-07-25 PROCEDURE — 3008F BODY MASS INDEX DOCD: CPT | Performed by: STUDENT IN AN ORGANIZED HEALTH CARE EDUCATION/TRAINING PROGRAM

## 2023-07-25 PROCEDURE — 99213 OFFICE O/P EST LOW 20 MIN: CPT | Performed by: STUDENT IN AN ORGANIZED HEALTH CARE EDUCATION/TRAINING PROGRAM

## 2023-07-25 PROCEDURE — 3078F DIAST BP <80 MM HG: CPT | Performed by: STUDENT IN AN ORGANIZED HEALTH CARE EDUCATION/TRAINING PROGRAM

## 2023-07-25 NOTE — TELEPHONE ENCOUNTER
Pt is experiencing pain with breast feeding. 6 months post partum. Has not had clogged milk ducts for a long time and would experience fever. Feels like the same symptoms are coming back. Does not feel engorged. Still pumping and milk is normal. The tender area seems to be producing slightly less. Please call. Received notification that patient would like to start UVB therapy. Orders, PA has been placed. Consent is still needed. Patient is requesting UVB at [facility]. Dr. Mariusz Rasmussen & [facility] is no longer done at the  clinic. Sameer  was removed over 1.5 years ago. Patient will need to have UVB at [facility] or [facility]. Consent has to be signed by patient and physician before 1st UVB treatment (if done at [facility], as the physicians there are not able to consent treatment/procedures for non-established patients).

## 2023-07-25 NOTE — TELEPHONE ENCOUNTER
Patient name and  verified. Patient complaining of L breast pain that began yesterday. Also voices breast is tender to touch and is producing less milk on L side. Also voices redness to breast, chills, and body aches. Believes she is experiencing clogged duct. Appt scheduled for today with [facility]. Aware of scheduling details.

## 2023-07-28 ENCOUNTER — TELEPHONE (OUTPATIENT)
Dept: OBGYN CLINIC | Facility: CLINIC | Age: 28
End: 2023-07-28

## 2023-07-28 NOTE — TELEPHONE ENCOUNTER
PSR is going to reach out to patient to set up sooner appointment. SHANEL Calvo  to [employer] Nurse Msg Pool      Â  4:14 PM  Unfortunately Dr. Andrea Samano doesnât have any earlier openings. You can check our other providers schedule for something sooner including Dr. Leila Ward, Dr. Jason Ellis, Dr. Carolyn Daly or myself. Patient name and  verified. Patient voices still has L breast lump and decreased supply to breast. Patient following all recommendations provided from [facility] and still taking antibiotics. Denies fever. Routing to provider for further recommendations.

## 2023-07-28 NOTE — TELEPHONE ENCOUNTER
Pt was seen on 7/25 for lump in breast. Pt still has lump. It is not as painful as before. Fever has been gone for 2 days. Wanted to know when she should see improvement in lump size. The milk supply is still down from 5 oz to 2 oz. Please call.

## 2023-07-28 NOTE — TELEPHONE ENCOUNTER
LMTCB. Will advised pt that lump may take time to go down. Can offer a breast ultrasound although may not be necessary as fever and pain have subsided. Continue pumping and feeding.

## 2023-07-31 NOTE — TELEPHONE ENCOUNTER
Called patient for follow-up today. She states she is feeling much better. Lump has gone away and supply is starting to increase. Discussed methods to prevent future clogged ducts. All questions answered.

## 2023-09-06 ENCOUNTER — TELEPHONE (OUTPATIENT)
Dept: OBGYN CLINIC | Facility: CLINIC | Age: 28
End: 2023-09-06

## 2023-09-06 NOTE — TELEPHONE ENCOUNTER
Patient calling to get an update. Would like to know if Fax has been received from [facility]. Please call.

## 2023-09-06 NOTE — TELEPHONE ENCOUNTER
[employer] calling to see if form was received and status. Form has been faxed twice in August  Pt would like to donate her breast milk.     Pls advise  Fax [phone]

## 2024-04-04 ENCOUNTER — OFFICE VISIT (OUTPATIENT)
Dept: FAMILY MEDICINE CLINIC | Facility: CLINIC | Age: 29
End: 2024-04-04

## 2024-04-04 VITALS
HEART RATE: 65 BPM | DIASTOLIC BLOOD PRESSURE: 72 MMHG | BODY MASS INDEX: 24.59 KG/M2 | HEIGHT: 64 IN | WEIGHT: 144 LBS | SYSTOLIC BLOOD PRESSURE: 108 MMHG

## 2024-04-04 DIAGNOSIS — R19.7 DIARRHEA OF PRESUMED INFECTIOUS ORIGIN: Primary | ICD-10-CM

## 2024-04-04 PROCEDURE — 3078F DIAST BP <80 MM HG: CPT | Performed by: PHYSICIAN ASSISTANT

## 2024-04-04 PROCEDURE — 99213 OFFICE O/P EST LOW 20 MIN: CPT | Performed by: PHYSICIAN ASSISTANT

## 2024-04-04 PROCEDURE — 3008F BODY MASS INDEX DOCD: CPT | Performed by: PHYSICIAN ASSISTANT

## 2024-04-04 PROCEDURE — 3074F SYST BP LT 130 MM HG: CPT | Performed by: PHYSICIAN ASSISTANT

## 2024-04-04 NOTE — PROGRESS NOTES
HPI:     HPI  A 28-year-old female is here in the office complaining of headache, body ache, and diarrhea 4 days ago. The patient has no diarrhea today.  The patient denies chest pain, SOB, N/V, fever, dizziness, syncope, and abdominal pain. There are no other concerns today.    Medications:     No current outpatient medications on file.       Allergies:   No Known Allergies    History:     Health Maintenance   Topic Date Due    Annual Physical  Never done    COVID-19 Vaccine (1 - 2023-24 season) Never done    Annual Depression Screening  01/01/2024    Influenza Vaccine (Season Ended) 10/01/2024    Pap Smear  08/04/2025    DTaP,Tdap,and Td Vaccines (2 - Td or Tdap) 11/11/2032    Pneumococcal Vaccine: Birth to 64yrs  Aged Out       No LMP recorded (lmp unknown).   Past Medical History:     Past Medical History:   Diagnosis Date    Decorative tattoo        Past Surgical History:     Past Surgical History:   Procedure Laterality Date    D & c  2022    Hysteroscopy         Family History:     Family History   Problem Relation Age of Onset    No Known Problems Father     No Known Problems Mother        Social History:     Social History     Socioeconomic History    Marital status: Single     Spouse name: Not on file    Number of children: Not on file    Years of education: Not on file    Highest education level: Not on file   Occupational History    Not on file   Tobacco Use    Smoking status: Never    Smokeless tobacco: Never   Vaping Use    Vaping Use: Never used   Substance and Sexual Activity    Alcohol use: Not Currently    Drug use: Never    Sexual activity: Not on file   Other Topics Concern    Not on file   Social History Narrative    Not on file     Social Determinants of Health     Financial Resource Strain: Not on file   Food Insecurity: Not on file   Transportation Needs: Not on file   Physical Activity: Not on file   Stress: Not on file   Social Connections: Not on file   Housing Stability: Not on file        Review of Systems:   Review of Systems   Constitutional:  Negative for activity change, chills, fatigue and fever.   HENT:  Negative for congestion, ear discharge, ear pain, postnasal drip, rhinorrhea, sinus pressure, sinus pain and sore throat.    Respiratory:  Negative for cough, chest tightness, shortness of breath and wheezing.    Cardiovascular:  Negative for chest pain and palpitations.   Gastrointestinal:  Negative for abdominal distention, abdominal pain, blood in stool, constipation, diarrhea, nausea and vomiting.   Skin:  Negative for rash.        Vitals:    04/04/24 0946   BP: 108/72   Pulse: 65   Weight: 144 lb (65.3 kg)   Height: 5' 4\" (1.626 m)     Body mass index is 24.72 kg/m².    Physical Exam:   Physical Exam  Vitals reviewed.   Constitutional:       General: She is not in acute distress.     Appearance: She is well-developed.   HENT:      Head: Normocephalic and atraumatic.      Right Ear: External ear normal.      Left Ear: External ear normal.      Nose: Nose normal.   Eyes:      General:         Right eye: No discharge.         Left eye: No discharge.      Conjunctiva/sclera: Conjunctivae normal.   Cardiovascular:      Rate and Rhythm: Normal rate and regular rhythm.      Heart sounds: Normal heart sounds, S1 normal and S2 normal. No murmur heard.  Pulmonary:      Effort: Pulmonary effort is normal.      Breath sounds: Normal breath sounds. No wheezing or rales.   Chest:      Chest wall: No tenderness.   Abdominal:      General: Abdomen is flat. Bowel sounds are normal. There is no distension.      Palpations: Abdomen is soft.      Tenderness: There is no abdominal tenderness. There is no right CVA tenderness or left CVA tenderness.   Lymphadenopathy:      Cervical: No cervical adenopathy.   Skin:     Findings: No rash.   Neurological:      Mental Status: She is alert and oriented to person, place, and time.   Psychiatric:         Behavior: Behavior is cooperative.          Assessment and  Plan::     Problem List Items Addressed This Visit    None  Visit Diagnoses       Diarrhea of presumed infectious origin    -  Primary        Start a BRAT diet which can help with diarrhea: Bananas, rice, applesauce and toast. Also can try Pedialyte or Gatorade as needed to stay hydrated. Over the counter Probiotics such as Florastor or a generic store brand can also help with diarrhea.     Discussed plan of care with pt and pt is in agreement.All questions answered. Pt to call with questions or concerns.

## (undated) DIAGNOSIS — O20.0 THREATENED ABORTION, ANTEPARTUM: Primary | ICD-10-CM

## (undated) DEVICE — ENCORE® LATEX MICRO SIZE 7.5, STERILE LATEX POWDER-FREE SURGICAL GLOVE: Brand: ENCORE

## (undated) DEVICE — Device: Brand: JELCO

## (undated) DEVICE — ENCORE® LATEX ACCLAIM SIZE 7, STERILE LATEX POWDER-FREE SURGICAL GLOVE: Brand: ENCORE

## (undated) DEVICE — SOL  .9 1000ML BTL

## (undated) DEVICE — CURRETTE 7MM CVD

## (undated) DEVICE — STIRRUP STRAP W/SLING RING

## (undated) DEVICE — CANISTER SCT BTL SL CAP BRKLY

## (undated) DEVICE — STERILE SURGICAL LUBRICANT, METAL TUBE: Brand: SURGILUBE

## (undated) DEVICE — D AND C PACK: Brand: MEDLINE INDUSTRIES, INC.

## (undated) DEVICE — TUBING SUCTION COLLECTION SET

## (undated) DEVICE — CURRETTE 8MM CVD

## (undated) DEVICE — CANISTER SAFETOUCH SYST DISP

## (undated) NOTE — LETTER
MINOR CASE LETTER      2/28/2022        Dear Kim Saleh are having a Suction Dilation & Curettage on Tuesday, 03/01/2022 at 10:30am. You are to arrive 2 hours prior, which would be 08:30am.    Do not eat or drink anything (including water) after midnight the night before surgery. If your procedure is scheduled later in the day, then nothing by mouth for 6 hours before arrival to the hospital.    Jaiden Calvert are to call this office if you have any cold or flu symptoms 2 days before your scheduled surgery. Please avoid aspirin 7 days before surgery. Avoid Ibuprofen, Motrin, Aleve, or Naprosyn for 3 days before surgery. You will be contacted by PreAdmission Testing (PAT) usually within the week before surgery. They will take a short medical history and let you know if any preoperative testing is needed. Please make arrangements for someone to drive you home after your surgery. Please feel free to contact our office at [phone] if you have any questions regarding these instructions or your procedure. Sincerely,          Robert Powell.  Kristal Rogel MD  [facility], [address] [place] [address] Street, [address] [address] [phone]

## (undated) NOTE — LETTER
12/20/2022              [address] [place] [address]         To Whom It May Concern,    The above named patient is currently under my prenatal care. At this time, I recommend my patient to work 28-30 hours per week until further notice. This is due to her advanced state in her pregnancy. Please contact my office if you have any questions.      Sincerely,    MD Andrea Sanders   [address] [place] [address]  [phone]

## (undated) NOTE — LETTER
4/4/2024          To Whom It May Concern:    Mary Ellis is currently under my medical care and may not return to work at this time.      She may return to work on 04/05/2024.  Activity is restricted as follows: none.    If you require additional information please contact our office.        Sincerely,    Christy Redd PA-C          Document generated by:  Christy Redd PA-C

## (undated) NOTE — LETTER
1/21/2021              Jeniffer Reynoso        [address]         Dear Nadine Hernandez,    [facility] records indicate that the pelvic ultrasound test ordered for you by Dallin Moon. Irving Frankel, MD  has not been done.   If you have, i

## (undated) NOTE — LETTER
6/25/2022              [facility]         To Whom It May Concern,    The above named patient is currently under my prenatal care. At this time, I am recommending my patient does not lift, push, or pull anything greater than 15 lbs for the remainder of her pregnancy. Pleases feel free to contact my office with any questions.      Sincerely,    Gino Jauregui MD  [address] [place]  [address] [place] [address]  [phone]

## (undated) NOTE — LETTER
VACCINE ADMINISTRATION RECORD  PARENT / GUARDIAN APPROVAL  Date: 2022  Vaccine administered to: Daysi Márquez     : 1995    MRN: [id number]    A copy of the appropriate Centers for Disease Control and Prevention Vaccine Information statement has been provided. I have read or have had explained the information about the diseases and the vaccines listed below. There was an opportunity to ask questions and any questions were answered satisfactorily. I believe that I understand the benefits and risks of the vaccine cited and ask that the vaccine(s) listed below be given to me or to the person named above (for whom I am authorized to make this request). VACCINES ADMINISTERED:  T-DAP    I have read and hereby agree to be bound by the terms of this agreement as stated above. My signature is valid until revoked by me in writing. This document is signed by Javan Mendez, relationship: Self on 2022.:                                                                                                                                         Parent / Guardian Signature                                                Date    David Pedraza served as a witness to authentication that the identity of the person signing electronically is in fact the person represented as signing.

## (undated) NOTE — LETTER
7/5/2023          To Whom It May Concern:    Please excuse Amey Goldberg for 1 days. She may return to work on 7/6/2023. Activity is restricted as follows: none. If you require additional information please contact our office.         Sincerely,    STEFFANIE Reyna          Document generated by:  STEFFANIE Reyna